# Patient Record
Sex: FEMALE | Race: BLACK OR AFRICAN AMERICAN | Employment: FULL TIME | URBAN - METROPOLITAN AREA
[De-identification: names, ages, dates, MRNs, and addresses within clinical notes are randomized per-mention and may not be internally consistent; named-entity substitution may affect disease eponyms.]

---

## 2019-01-15 ENCOUNTER — HOSPITAL ENCOUNTER (OUTPATIENT)
Age: 29
Setting detail: SPECIMEN
Discharge: HOME | End: 2019-01-15
Payer: COMMERCIAL

## 2019-01-15 ENCOUNTER — OFFICE VISIT (OUTPATIENT)
Dept: FAMILY MEDICINE CLINIC | Age: 29
End: 2019-01-15
Payer: COMMERCIAL

## 2019-01-15 VITALS
SYSTOLIC BLOOD PRESSURE: 120 MMHG | DIASTOLIC BLOOD PRESSURE: 70 MMHG | TEMPERATURE: 98 F | BODY MASS INDEX: 38.95 KG/M2 | HEART RATE: 112 BPM | WEIGHT: 257 LBS | RESPIRATION RATE: 24 BRPM | HEIGHT: 68 IN | OXYGEN SATURATION: 98 %

## 2019-01-15 DIAGNOSIS — J06.9 ACUTE UPPER RESPIRATORY INFECTION: ICD-10-CM

## 2019-01-15 DIAGNOSIS — J02.9 SORE THROAT: ICD-10-CM

## 2019-01-15 DIAGNOSIS — H66.91 RIGHT OTITIS MEDIA, UNSPECIFIED OTITIS MEDIA TYPE: Primary | ICD-10-CM

## 2019-01-15 LAB — S PYO AG THROAT QL: NORMAL

## 2019-01-15 PROCEDURE — 99213 OFFICE O/P EST LOW 20 MIN: CPT | Performed by: NURSE PRACTITIONER

## 2019-01-15 PROCEDURE — 87880 STREP A ASSAY W/OPTIC: CPT | Performed by: NURSE PRACTITIONER

## 2019-01-15 PROCEDURE — 87070 CULTURE OTHR SPECIMN AEROBIC: CPT

## 2019-01-15 RX ORDER — NORGESTIMATE AND ETHINYL ESTRADIOL 7DAYSX3 28
KIT ORAL
Refills: 3 | COMMUNITY
Start: 2019-01-09 | End: 2021-06-25 | Stop reason: ALTCHOICE

## 2019-01-15 RX ORDER — LEVOTHYROXINE SODIUM 0.07 MG/1
TABLET ORAL
Refills: 3 | COMMUNITY
Start: 2018-12-15 | End: 2021-07-16 | Stop reason: ALTCHOICE

## 2019-01-15 RX ORDER — CEFDINIR 300 MG/1
300 CAPSULE ORAL 2 TIMES DAILY
Qty: 20 CAPSULE | Refills: 0 | Status: SHIPPED | OUTPATIENT
Start: 2019-01-15 | End: 2019-01-25

## 2019-01-15 ASSESSMENT — ENCOUNTER SYMPTOMS
COUGH: 1
SINUS PAIN: 1
WHEEZING: 0
SORE THROAT: 1
RHINORRHEA: 1
SINUS PRESSURE: 1
SHORTNESS OF BREATH: 1

## 2019-01-15 ASSESSMENT — PATIENT HEALTH QUESTIONNAIRE - PHQ9
2. FEELING DOWN, DEPRESSED OR HOPELESS: 0
SUM OF ALL RESPONSES TO PHQ QUESTIONS 1-9: 0
SUM OF ALL RESPONSES TO PHQ9 QUESTIONS 1 & 2: 0
1. LITTLE INTEREST OR PLEASURE IN DOING THINGS: 0
SUM OF ALL RESPONSES TO PHQ QUESTIONS 1-9: 0

## 2019-01-18 LAB — THROAT CULTURE: NORMAL

## 2020-06-12 ENCOUNTER — HOSPITAL ENCOUNTER (EMERGENCY)
Age: 30
Discharge: HOME | End: 2020-06-12
Attending: EMERGENCY MEDICINE
Payer: COMMERCIAL

## 2020-06-12 VITALS
WEIGHT: 240 LBS | RESPIRATION RATE: 18 BRPM | BODY MASS INDEX: 36.37 KG/M2 | TEMPERATURE: 98.5 F | HEART RATE: 79 BPM | DIASTOLIC BLOOD PRESSURE: 66 MMHG | HEIGHT: 68 IN | SYSTOLIC BLOOD PRESSURE: 136 MMHG | OXYGEN SATURATION: 99 %

## 2020-06-12 LAB
ALBUMIN SERPL-MCNC: 4.5 G/DL (ref 3.5–4.6)
ALP BLD-CCNC: 54 U/L (ref 40–130)
ALT SERPL-CCNC: 21 U/L (ref 0–33)
ANION GAP SERPL CALCULATED.3IONS-SCNC: 11 MEQ/L (ref 9–15)
AST SERPL-CCNC: 19 U/L (ref 0–35)
BACTERIA: ABNORMAL /HPF
BASOPHILS ABSOLUTE: 0 K/UL (ref 0–0.2)
BASOPHILS RELATIVE PERCENT: 0.4 %
BILIRUB SERPL-MCNC: <0.2 MG/DL (ref 0.2–0.7)
BILIRUBIN URINE: NEGATIVE
BLOOD, URINE: NEGATIVE
BUN BLDV-MCNC: 10 MG/DL (ref 6–20)
CALCIUM SERPL-MCNC: 9.7 MG/DL (ref 8.5–9.9)
CHLORIDE BLD-SCNC: 105 MEQ/L (ref 95–107)
CLARITY: CLEAR
CO2: 24 MEQ/L (ref 20–31)
COLOR: YELLOW
CREAT SERPL-MCNC: 0.83 MG/DL (ref 0.5–0.9)
EOSINOPHILS ABSOLUTE: 0.1 K/UL (ref 0–0.7)
EOSINOPHILS RELATIVE PERCENT: 0.6 %
EPITHELIAL CELLS, UA: ABNORMAL /HPF
GFR AFRICAN AMERICAN: >60
GFR NON-AFRICAN AMERICAN: >60
GLOBULIN: 3.5 G/DL (ref 2.3–3.5)
GLUCOSE BLD-MCNC: 93 MG/DL (ref 70–99)
GLUCOSE URINE: NEGATIVE MG/DL
HCG(URINE) PREGNANCY TEST: NEGATIVE
HCT VFR BLD CALC: 39.2 % (ref 37–47)
HEMOGLOBIN: 12.8 G/DL (ref 12–16)
KETONES, URINE: NEGATIVE MG/DL
LEUKOCYTE ESTERASE, URINE: NORMAL
LYMPHOCYTES ABSOLUTE: 2.2 K/UL (ref 1–4.8)
LYMPHOCYTES RELATIVE PERCENT: 19.7 %
MCH RBC QN AUTO: 27.7 PG (ref 27–31.3)
MCHC RBC AUTO-ENTMCNC: 32.7 % (ref 33–37)
MCV RBC AUTO: 84.6 FL (ref 82–100)
MONOCYTES ABSOLUTE: 1.1 K/UL (ref 0.2–0.8)
MONOCYTES RELATIVE PERCENT: 10.3 %
NEUTROPHILS ABSOLUTE: 7.5 K/UL (ref 1.4–6.5)
NEUTROPHILS RELATIVE PERCENT: 69 %
NITRITE, URINE: NEGATIVE
PDW BLD-RTO: 13.6 % (ref 11.5–14.5)
PH UA: 6 (ref 5–9)
PLATELET # BLD: 341 K/UL (ref 130–400)
POTASSIUM SERPL-SCNC: 3.8 MEQ/L (ref 3.4–4.9)
PROTEIN UA: NEGATIVE MG/DL
RBC # BLD: 4.63 M/UL (ref 4.2–5.4)
RBC UA: ABNORMAL /HPF (ref 0–2)
SODIUM BLD-SCNC: 140 MEQ/L (ref 135–144)
SPECIFIC GRAVITY UA: <=1.005 (ref 1–1.03)
TOTAL PROTEIN: 8 G/DL (ref 6.3–8)
URINE REFLEX TO CULTURE: NORMAL
UROBILINOGEN, URINE: 0.2 E.U./DL
WBC # BLD: 10.9 K/UL (ref 4.8–10.8)
WBC UA: ABNORMAL /HPF (ref 0–5)

## 2020-06-12 PROCEDURE — 81001 URINALYSIS AUTO W/SCOPE: CPT

## 2020-06-12 PROCEDURE — 87491 CHLMYD TRACH DNA AMP PROBE: CPT

## 2020-06-12 PROCEDURE — 87591 N.GONORRHOEAE DNA AMP PROB: CPT

## 2020-06-12 PROCEDURE — 99284 EMERGENCY DEPT VISIT MOD MDM: CPT

## 2020-06-12 PROCEDURE — 80053 COMPREHEN METABOLIC PANEL: CPT

## 2020-06-12 PROCEDURE — 85025 COMPLETE CBC W/AUTO DIFF WBC: CPT

## 2020-06-12 PROCEDURE — 84703 CHORIONIC GONADOTROPIN ASSAY: CPT

## 2020-06-12 RX ORDER — KETOROLAC TROMETHAMINE 10 MG/1
10 TABLET, FILM COATED ORAL EVERY 6 HOURS PRN
Qty: 20 TABLET | Refills: 0 | Status: SHIPPED | OUTPATIENT
Start: 2020-06-12 | End: 2021-01-29

## 2020-06-12 ASSESSMENT — ENCOUNTER SYMPTOMS
BACK PAIN: 1
COUGH: 0
VOMITING: 0
DIARRHEA: 0
ABDOMINAL PAIN: 1

## 2020-06-12 ASSESSMENT — PAIN DESCRIPTION - PAIN TYPE: TYPE: ACUTE PAIN

## 2020-06-12 ASSESSMENT — PAIN DESCRIPTION - ORIENTATION: ORIENTATION: LOWER

## 2020-06-12 ASSESSMENT — PAIN DESCRIPTION - FREQUENCY: FREQUENCY: INTERMITTENT

## 2020-06-12 ASSESSMENT — PAIN DESCRIPTION - DESCRIPTORS: DESCRIPTORS: CRAMPING;STABBING

## 2020-06-12 ASSESSMENT — PAIN SCALES - GENERAL: PAINLEVEL_OUTOF10: 8

## 2020-06-12 ASSESSMENT — PAIN - FUNCTIONAL ASSESSMENT: PAIN_FUNCTIONAL_ASSESSMENT: PREVENTS OR INTERFERES SOME ACTIVE ACTIVITIES AND ADLS

## 2020-06-12 ASSESSMENT — PAIN DESCRIPTION - LOCATION: LOCATION: ABDOMEN

## 2020-06-12 ASSESSMENT — PAIN DESCRIPTION - ONSET: ONSET: ON-GOING

## 2020-06-12 NOTE — ED PROVIDER NOTES
1 TABLET BY MOUTH EVERY DAY       ALLERGIES     Codeine    FAMILY HISTORY     History reviewed. No pertinent family history. SOCIAL HISTORY       Social History     Socioeconomic History    Marital status: Single     Spouse name: None    Number of children: None    Years of education: None    Highest education level: None   Occupational History    None   Social Needs    Financial resource strain: None    Food insecurity     Worry: None     Inability: None    Transportation needs     Medical: None     Non-medical: None   Tobacco Use    Smoking status: Never Smoker    Smokeless tobacco: Never Used   Substance and Sexual Activity    Alcohol use: Yes     Comment: pt drank 3 white claws tonight    Drug use: Not Currently    Sexual activity: None   Lifestyle    Physical activity     Days per week: None     Minutes per session: None    Stress: None   Relationships    Social connections     Talks on phone: None     Gets together: None     Attends [employer] service: None     Active member of club or organization: None     Attends meetings of clubs or organizations: None     Relationship status: None    Intimate partner violence     Fear of current or ex partner: None     Emotionally abused: None     Physically abused: None     Forced sexual activity: None   Other Topics Concern    None   Social History Narrative    None       SCREENINGS      @FLOW([id number])@      PHYSICAL EXAM    (up to 7 for level 4, 8 or more for level 5)     ED Triage Vitals [06/12/20 0059]   BP Temp Temp Source Pulse Resp SpO2 Height Weight   136/66 98.5 °F (36.9 °C) Infrared 79 18 99 % 5' 8\" (1.727 m) 240 lb (108.9 kg)       Physical Exam  This is a well-developed well-nourished patient without distress. Conjunctivae are clear. No visible sinus discharge. No significant facial swelling. Neck supple without visible JVD. Lungs are clear and symmetric without distress. Regular rate and rhythm without murmurs. Abdomen soft. TempSrc: Infrared   SpO2: 99%   Weight: 240 lb (108.9 kg)   Height: 5' 8\" (1.727 m)       Patient has history of ovarian cyst and could have recurrence. Outpatient pelvic ultrasound prescription given. Symptomatic treatment with oral Toradol. Discussed with patient expressed understanding and agreement. MDM    CRITICAL CARE TIME   Total Critical Care time was  minutes, excluding separately reportableprocedures. There was a high probability of clinicallysignificant/life threatening deterioration in the patient's condition which required my urgent intervention. CONSULTS:  None    PROCEDURES:  Unless otherwise noted below, none     Procedures    FINAL IMPRESSION      1.  Pelvic pain in female        DISPOSITION/PLAN   DISPOSITION Decision To Discharge 06/12/2020 01:48:01 AM      PATIENT REFERRED TO:  ROBYN Ham  [facility].  [phone]      As needed if not improving within 72 hours      DISCHARGE MEDICATIONS:  New Prescriptions    KETOROLAC (TORADOL) 10 MG TABLET    Take 1 tablet by mouth every 6 hours as needed for Pain          (Please note that portions of this note were completed with a voice recognitionprogram.  Efforts were made to edit the dictations but occasionally words are mis-transcribed.)    Tracey Marin MD (electronically signed)  Attending Emergency Physician        Lawson Dallas MD  06/12/20 0150

## 2020-06-12 NOTE — ED TRIAGE NOTES
Pt to ED with c/o lower abdominal/groin pain for 3 days. Pt states she has had this pain every couple months. Pt states she took advil tonight around midnight. Pt also states she drank 3 white claws tonight.  Denies nausea, Last BM today

## 2020-06-17 LAB
C. TRACHOMATIS DNA ,URINE: NEGATIVE
N. GONORRHOEAE DNA, URINE: NEGATIVE

## 2020-12-31 ENCOUNTER — HOSPITAL ENCOUNTER (EMERGENCY)
Age: 30
Discharge: HOME | End: 2020-12-31
Attending: EMERGENCY MEDICINE
Payer: COMMERCIAL

## 2020-12-31 ENCOUNTER — APPOINTMENT (OUTPATIENT)
Dept: GENERAL RADIOLOGY | Age: 30
End: 2020-12-31
Payer: COMMERCIAL

## 2020-12-31 VITALS
DIASTOLIC BLOOD PRESSURE: 69 MMHG | RESPIRATION RATE: 17 BRPM | OXYGEN SATURATION: 100 % | TEMPERATURE: 98.1 F | WEIGHT: 243 LBS | HEIGHT: 68 IN | SYSTOLIC BLOOD PRESSURE: 115 MMHG | HEART RATE: 77 BPM | BODY MASS INDEX: 36.83 KG/M2

## 2020-12-31 DIAGNOSIS — S93.401A SPRAIN OF RIGHT ANKLE, UNSPECIFIED LIGAMENT, INITIAL ENCOUNTER: Primary | ICD-10-CM

## 2020-12-31 PROCEDURE — 6370000000 HC RX 637 (ALT 250 FOR IP): Performed by: EMERGENCY MEDICINE

## 2020-12-31 PROCEDURE — 73610 X-RAY EXAM OF ANKLE: CPT

## 2020-12-31 PROCEDURE — 99283 EMERGENCY DEPT VISIT LOW MDM: CPT

## 2020-12-31 PROCEDURE — 99284 EMERGENCY DEPT VISIT MOD MDM: CPT

## 2020-12-31 RX ORDER — ETONOGESTREL AND ETHINYL ESTRADIOL 11.7; 2.7 MG/1; MG/1
1 INSERT, EXTENDED RELEASE VAGINAL SEE ADMIN INSTRUCTIONS
COMMUNITY
End: 2021-10-26

## 2020-12-31 RX ORDER — IBUPROFEN 400 MG/1
800 TABLET ORAL ONCE
Status: COMPLETED | OUTPATIENT
Start: 2020-12-31 | End: 2020-12-31

## 2020-12-31 RX ADMIN — IBUPROFEN 800 MG: 400 TABLET, FILM COATED ORAL at 21:35

## 2020-12-31 ASSESSMENT — PAIN SCALES - GENERAL: PAINLEVEL_OUTOF10: 6

## 2020-12-31 ASSESSMENT — PAIN DESCRIPTION - ORIENTATION
ORIENTATION: RIGHT
ORIENTATION: RIGHT

## 2020-12-31 ASSESSMENT — PAIN DESCRIPTION - DESCRIPTORS: DESCRIPTORS: SHARP

## 2020-12-31 ASSESSMENT — PAIN DESCRIPTION - LOCATION: LOCATION: ANKLE

## 2021-01-01 NOTE — ED PROVIDER NOTES
[facility] ED  EMERGENCY DEPARTMENT ENCOUNTER      Pt Name: Carito Canseco  MRN: [id number]  [place] 1990  Date of evaluation: 12/31/2020  Provider: Sachi Donald DO        HISTORY OF PRESENT ILLNESS    Carito Canseco is a 35 y.o. female per chart review has ah/o PCO's and thyroid disease. She was walking on steps and slipped. The history is provided by the patient. Ankle Problem  Location:  Ankle  Time since incident:  30 minutes  Injury: yes    Mechanism of injury: fall    Fall:     Fall occurred:  Standing    Impact surface:  Leslie    Entrapped after fall: no    Ankle location:  R ankle  Pain details:     Quality:  Aching    Radiates to:  Does not radiate    Severity:  Moderate    Onset quality:  Sudden    Timing:  Constant  Chronicity:  New  Dislocation: no    Foreign body present:  No foreign bodies  Prior injury to area:  No  Relieved by:  Nothing  Worsened by:  Nothing  Ineffective treatments:  None tried  Associated symptoms: no back pain, no fever and no neck pain             REVIEW OF SYSTEMS       Review of Systems   Constitutional: Negative for chills and fever. HENT: Negative for ear pain and sore throat. Eyes: Negative for discharge and redness. Respiratory: Negative for cough and shortness of breath. Cardiovascular: Negative for chest pain and palpitations. Gastrointestinal: Negative for abdominal pain, nausea and vomiting. Genitourinary: Negative for difficulty urinating and dysuria. Musculoskeletal: Positive for arthralgias. Negative for back pain and neck pain. Skin: Negative for rash and wound. Neurological: Negative for dizziness and syncope. Psychiatric/Behavioral: Negative for confusion. The patient is not nervous/anxious. All other systems reviewed and are negative. Except as noted above the remainder of the review of systems was reviewed and negative.        PAST MEDICAL HISTORY     Past Medical History:   Diagnosis Date    PCOS (polycystic ovarian syndrome)     Thyroid disease          SURGICAL HISTORY       Past Surgical History:   Procedure Laterality Date    LAPAROSCOPY           CURRENT MEDICATIONS       Previous Medications    ETONOGESTREL-ETHINYL ESTRADIOL (NUVARING) 0.12-0.015 MG/24HR VAGINAL RING    Place 1 each vaginally See Admin Instructions    KETOROLAC (TORADOL) 10 MG TABLET    Take 1 tablet by mouth every 6 hours as needed for Pain    LEVOTHYROXINE (SYNTHROID) 75 MCG TABLET    TAKE 1 TABLET BY MOUTH EVERY DAY BEFORE BREAKFAST    TRI FEMYNOR 0.18/0.215/0.25 MG-35 MCG TABS    TAKE 1 TABLET BY MOUTH EVERY DAY       ALLERGIES     Codeine    FAMILY HISTORY     History reviewed. No pertinent family history. SOCIAL HISTORY       Social History     Socioeconomic History    Marital status: Single     Spouse name: None    Number of children: None    Years of education: None    Highest education level: None   Occupational History    None   Social Needs    Financial resource strain: None    Food insecurity     Worry: None     Inability: None    Transportation needs     Medical: None     Non-medical: None   Tobacco Use    Smoking status: Never Smoker    Smokeless tobacco: Never Used   Substance and Sexual Activity    Alcohol use:  Yes    Drug use: Never    Sexual activity: None   Lifestyle    Physical activity     Days per week: None     Minutes per session: None    Stress: None   Relationships    Social connections     Talks on phone: None     Gets together: None     Attends Anabaptist service: None     Active member of club or organization: None     Attends meetings of clubs or organizations: None     Relationship status: None    Intimate partner violence     Fear of current or ex partner: None     Emotionally abused: None     Physically abused: None     Forced sexual activity: None   Other Topics Concern    None   Social History Narrative    None         PHYSICAL EXAM       ED Triage Vitals [12/31/20 2107]   BP Temp Temp MDM  Number of Diagnoses or Management Options  Sprain of right ankle, unspecified ligament, initial encounter  Diagnosis management comments: Patient presents with right ankle pain after slipping on steps tonight. Xray of the right ankle ordered. Xray of the right ankle is negative. Crutches and air cast ordered. The patient will elevate, ice and NSAIDs. She will stay off the ankle. Oleg CABAN     The lab results, radiology and test results were reviewed with the patient and family. The patient will follow up in 2 days with their primary care doctor. If their symptoms change or get worse they will return to the ER. CRITICAL CARE TIME   Total CriticalCare time was 0 minutes, excluding separately reportable procedures. There was a high probability of clinically significant/life threatening deterioration in the patient's condition which required my urgent intervention. PROCEDURES:  Unlessotherwise noted below, none     Procedures      FINAL IMPRESSION      1.  Sprain of right ankle, unspecified ligament, initial encounter          [facility], DO (electronically signed)  Attending Emergency Physician          Jacob Cade   12/31/20 [id number]

## 2021-01-01 NOTE — ED NOTES
Air cast applied. Crutch training given and pt returned demonstration.       Kimi Lares, RN  12/31/20 7386

## 2021-01-29 ENCOUNTER — HOSPITAL ENCOUNTER (OUTPATIENT)
Age: 31
Setting detail: SPECIMEN
Discharge: HOME | End: 2021-01-29
Payer: COMMERCIAL

## 2021-01-29 ENCOUNTER — VIRTUAL VISIT (OUTPATIENT)
Dept: FAMILY MEDICINE CLINIC | Age: 31
End: 2021-01-29
Payer: COMMERCIAL

## 2021-01-29 DIAGNOSIS — R05.8 PRODUCTIVE COUGH: Primary | ICD-10-CM

## 2021-01-29 DIAGNOSIS — R50.9 LOW GRADE FEVER: ICD-10-CM

## 2021-01-29 PROCEDURE — 99442 PR PHYS/QHP TELEPHONE EVALUATION 11-20 MIN: CPT | Performed by: NURSE PRACTITIONER

## 2021-01-29 SDOH — ECONOMIC STABILITY: FOOD INSECURITY: WITHIN THE PAST 12 MONTHS, YOU WORRIED THAT YOUR FOOD WOULD RUN OUT BEFORE YOU GOT MONEY TO BUY MORE.: NEVER TRUE

## 2021-01-29 SDOH — ECONOMIC STABILITY: INCOME INSECURITY: HOW HARD IS IT FOR YOU TO PAY FOR THE VERY BASICS LIKE FOOD, HOUSING, MEDICAL CARE, AND HEATING?: NOT HARD AT ALL

## 2021-01-29 ASSESSMENT — ENCOUNTER SYMPTOMS
NAUSEA: 1
RHINORRHEA: 1
COUGH: 1
SORE THROAT: 0
SHORTNESS OF BREATH: 0
ABDOMINAL PAIN: 0
WHEEZING: 0
DIARRHEA: 1
CHEST TIGHTNESS: 0

## 2021-01-29 ASSESSMENT — PATIENT HEALTH QUESTIONNAIRE - PHQ9
SUM OF ALL RESPONSES TO PHQ QUESTIONS 1-9: 0
1. LITTLE INTEREST OR PLEASURE IN DOING THINGS: 0

## 2021-01-29 NOTE — PATIENT INSTRUCTIONS
We'll call with COVID-19 results  Results will also be available on your [facility] account    Jony Maddox RN, MSN, [facility]  [facility] [address]  [address]: [phone]    Things to Know:     - Do not leave home except to get medical care while waiting for your results  - Testing does not change treatment- there is no medication that treats COVID-19  - Stay well-hydrated, rest as needed  - May take over the counter medicine acetaminophen (aka Tylenol) for pain or fever if needed according to the directions on the box if tolerated no allergies  - Monitor your symptoms + contact a medical provider if symptoms are worsening- such as difficulty breathing  - Follow social distancing guidelines and wear a face mask when leaving home is necessary  - Symptoms may develop 2 days to 2 weeks following exposure to the virus- if you are exposed after testing, or if you were in the incubation period when tested, you could become ill with COVID-19 later    Seek emergency medical care immediately (ER/ call 911) if you have trouble breathing, persistent pain or pressure in the chest, new confusion, inability to wake or stay awake, bluish lips or face, or any other signs/ symptoms that you think could be an emergency. Patient Education   10 Things to Do When You Have COVID-19    Stay home. Don't go to school, work, or public areas. And don't use public transportation, ride-shares, or taxis unless you have no choice. Leave your home only if you need to get medical care. But call the doctor's office first so they know you're coming. And wear a cloth face cover. Ask before leaving isolation. Talk with your doctor or other health professional about when it will be safe for you to leave isolation. Wear a cloth face cover when you are around other people. It can help stop the spread of the virus when you cough or sneeze. Limit contact with people in your home. If possible, stay in a separate bedroom and use a separate bathroom. Avoid contact with pets and other animals. If possible, have a friend or family member care for them while you're sick. Cover your mouth and nose with a tissue when you cough or sneeze. Then throw the tissue in the trash right away. Wash your hands often, especially after you cough or sneeze. Use soap and water, and scrub for at least 20 seconds. If soap and water aren't available, use an alcohol-based hand . Don't share personal household items. These include bedding, towels, cups and glasses, and eating utensils. Clean and disinfect your home every day. Use household  or disinfectant wipes or sprays. Take special care to clean things that you grab with your hands. These include doorknobs, remote controls, phones, and handles on your refrigerator and microwave. And don't forget countertops, tabletops, bathrooms, and computer keyboards. Take acetaminophen (Tylenol) to relieve fever and body aches. Read and follow all instructions on the label. Current as of: December 18, 2020               Content Version: 12.7  © 2006-2021 Healthwise, [facility]. Care instructions adapted under license by [facility] ([facility]). If you have questions about a medical condition or this instruction, always ask your healthcare professional. [place] [address] any warranty or liability for your use of this information.

## 2021-01-29 NOTE — PROGRESS NOTES
TELEHEALTH VISIT -- Audio Only     SUBJECTIVE:    Ly Chambers is a 35 y.o. female evaluated via telephone on 1/29/2021. Consent:  Malcolm Us and/or health care decision maker is aware that that she may receive a bill for this telephone service, depending on her insurance coverage, and has provided verbal consent to proceed: Yes    Documentation:  I communicated with the patient and/or health care decision maker about:    Chief Complaint   Patient presents with    Cough     started tuesday night dry cough, productive cough; wednesday headache, bodyache, and runny nose started  earache started today. low grade fever since wednesday 98.9-99.8      History of Present Illness:  Malcolm Us is here for telephone visit with URI symptoms, requests COVID-19 testing. Presenting symptoms: low-grade fever, cough, nasal congestion vs rhinorrhea, sputum production, R otalgia, diarrhea, generalized HA 3-5/10, muscle pain, fatigue/ malaise. Symptoms began: 1/26        Max temperature in last 24 hrs: 99.8- forehead thermometer  Patient denies: fever > 100.4, sore throat, shortness of breath, chest pain, abdominal pain, loss of smell, and loss of taste    Treatment to date: none. Exposure source: attended a birthday gathering at a [employer] in [place] -- [facility]. Relevant PMH: no pertinent PMH. Smoking history: patient  reports that she has never smoked. She has never used smokeless tobacco.   No recent travel. Review of Systems   Constitutional: Positive for chills, fatigue and fever (low grade). HENT: Positive for congestion, ear pain and rhinorrhea. Negative for sore throat. Respiratory: Positive for cough. Negative for chest tightness, shortness of breath and wheezing. Cardiovascular: Negative for chest pain and palpitations. Gastrointestinal: Positive for diarrhea and nausea. Negative for abdominal pain.      OBJECTIVE:     Vital Signs: (As obtained by: pt or caregiver) 15 minutes were spent on the phone with patient. Provider performed history of present illness and review of systems. Diagnosis and treatment plan was discussed with patient. Pharmacy of choice was reviewed along with past medical history, medication allergies, and current medications. Education provided to patient or patient parents/guardian with current illness diagnosis as well as when to seek additional healthcare due to changing or for worsening symptoms. Patient voiced understanding.

## 2021-01-29 NOTE — LETTER
[address]  Phone: [phone]  Fax: GIGI Wade CNP    Patient: Duncan Najera   Date of Visit: 1/29/2021       To Whom it May Concern:    Duncan Najera was evaluated during a virtual visit and tested today in the clinic. It is my medical opinion that Ms. Morgan Garnica should not return to work until . [employer] are back. We expect results in 2-5 days. If there are questions or concerns, please have the patient contact our office. Thank you for your assistance in this matter.           Respectfully,        Electronically signed by GIGI Landers CNP on 1/29/2021 at 3:47 PM

## 2021-01-30 DIAGNOSIS — R50.9 LOW GRADE FEVER: ICD-10-CM

## 2021-01-30 DIAGNOSIS — R05.8 PRODUCTIVE COUGH: ICD-10-CM

## 2021-02-02 LAB
SARS-COV-2: DETECTED
SOURCE: ABNORMAL

## 2021-02-03 ENCOUNTER — TELEPHONE (OUTPATIENT)
Dept: FAMILY MEDICINE CLINIC | Age: 31
End: 2021-02-03

## 2021-02-03 DIAGNOSIS — U07.1 COVID-19 VIRUS INFECTION: Primary | ICD-10-CM

## 2021-02-04 NOTE — TELEPHONE ENCOUNTER
Chief Complaint   Patient presents with    Results     notification of positive COVID-19 test result      Called and spoke with Sheldon Davalos. Patient was informed her COVID-19 test result was positive. The following information was given to the patient:   - The COVID-19 test result was positive- meaning she has the virus and is contagious  - Treatment of coronavirus does not require an antibiotic  - Stay well-hydrated, rest as needed  - May take OTC medicine for pain or fever, follow directions on label  - Remain isolated except to receive medical care for 10 days since symptoms first appeared and  o No fever for at least 24 hours without using medicine that lowers fever and  o Other symptoms have improved  - Wash hands often with soap and water for at least 20 seconds or alternatively use hand  with at least 60% alcohol content  - Clean all high-touch surfaces daily such as doorknobs and cell phones    Patient reports overall symptoms are stable. Reports loss of smell and occasional chest tightness, shortness of breath with activity. Feels a sensation of having water in nose. Denies dyspnea at rest or pleuritic pain. Anticipated return to work date 2/6. Counseled on warning symptoms that should prompt re-evaluation by telehealth visit or go to ED, such as difficulty breathing. Lab Results   Component Value Date/Time    COVID19 Detected (A) 01/30/2021 07:02 AM       ICD-10-CM    1. COVID-19 virus infection  U07.1      Questions answered. Understanding verbalized. Encouraged patient to call with any concerns/ additional questions.     Electronically signed by GIGI Buckner CNP on 2/3/2021 at 8:46 PM

## 2021-02-05 ENCOUNTER — PATIENT MESSAGE (OUTPATIENT)
Dept: FAMILY MEDICINE CLINIC | Age: 31
End: 2021-02-05

## 2021-02-05 NOTE — LETTER
NOTIFICATION RETURN TO WORK / SCHOOL    2/5/2021    Ms. Duncan Najera  [facility] [address]      To Whom It May Concern:    Duncan Najera was tested for COVID-19 on 1/30, and the result was Positive. Symptoms started 1/26/21. She has maintained Quarantine x 10 days (from first signs of symptoms). She may return to work on 2/8, if symptoms have improved and she has been fever free for 24 hours, without medication. I recommend:return without restrictions    If there are questions or concerns, please have the patient contact our office.     Sincerely,  Arpita Benton, GIGI - CNP

## 2021-06-25 ENCOUNTER — OFFICE VISIT (OUTPATIENT)
Dept: FAMILY MEDICINE CLINIC | Age: 31
End: 2021-06-25
Payer: COMMERCIAL

## 2021-06-25 VITALS
BODY MASS INDEX: 38.65 KG/M2 | SYSTOLIC BLOOD PRESSURE: 110 MMHG | HEIGHT: 68 IN | RESPIRATION RATE: 18 BRPM | OXYGEN SATURATION: 98 % | WEIGHT: 255 LBS | TEMPERATURE: 97.8 F | DIASTOLIC BLOOD PRESSURE: 68 MMHG | HEART RATE: 93 BPM

## 2021-06-25 DIAGNOSIS — R42 VERTIGO: Primary | ICD-10-CM

## 2021-06-25 DIAGNOSIS — Z00.00 ROUTINE GENERAL MEDICAL EXAMINATION AT A HEALTH CARE FACILITY: ICD-10-CM

## 2021-06-25 DIAGNOSIS — H66.92 LEFT OTITIS MEDIA, UNSPECIFIED OTITIS MEDIA TYPE: ICD-10-CM

## 2021-06-25 DIAGNOSIS — E28.2 PCOS (POLYCYSTIC OVARIAN SYNDROME): ICD-10-CM

## 2021-06-25 DIAGNOSIS — Z11.4 SCREENING FOR HIV (HUMAN IMMUNODEFICIENCY VIRUS): ICD-10-CM

## 2021-06-25 DIAGNOSIS — H93.8X3 PRESSURE SENSATION IN BOTH EARS: ICD-10-CM

## 2021-06-25 DIAGNOSIS — E07.9 THYROID DISEASE: ICD-10-CM

## 2021-06-25 DIAGNOSIS — R79.89 ELEVATED PROLACTIN LEVEL: ICD-10-CM

## 2021-06-25 DIAGNOSIS — Z11.59 NEED FOR HEPATITIS C SCREENING TEST: ICD-10-CM

## 2021-06-25 DIAGNOSIS — R61 NIGHT SWEATS: ICD-10-CM

## 2021-06-25 DIAGNOSIS — R00.2 PALPITATIONS: ICD-10-CM

## 2021-06-25 PROCEDURE — 99214 OFFICE O/P EST MOD 30 MIN: CPT | Performed by: NURSE PRACTITIONER

## 2021-06-25 RX ORDER — LEVOTHYROXINE SODIUM 0.07 MG/1
TABLET ORAL
Qty: 30 TABLET | Refills: 3 | Status: CANCELLED | OUTPATIENT
Start: 2021-06-25

## 2021-06-25 RX ORDER — AMOXICILLIN AND CLAVULANATE POTASSIUM 875; 125 MG/1; MG/1
1 TABLET, FILM COATED ORAL 2 TIMES DAILY
Qty: 20 TABLET | Refills: 0 | Status: SHIPPED | OUTPATIENT
Start: 2021-06-25 | End: 2021-07-05

## 2021-06-25 ASSESSMENT — PATIENT HEALTH QUESTIONNAIRE - PHQ9
SUM OF ALL RESPONSES TO PHQ9 QUESTIONS 1 & 2: 0
SUM OF ALL RESPONSES TO PHQ QUESTIONS 1-9: 0
2. FEELING DOWN, DEPRESSED OR HOPELESS: 0
SUM OF ALL RESPONSES TO PHQ QUESTIONS 1-9: 0
SUM OF ALL RESPONSES TO PHQ QUESTIONS 1-9: 0
1. LITTLE INTEREST OR PLEASURE IN DOING THINGS: 0

## 2021-06-25 NOTE — PROGRESS NOTES
Theodore Siemens is making the first visit to the office to establish. The primary issue is dizzy spells. Dizzy spells: happen more with position changes and sometimes can happen when she is driving. She gets nauseated with a bad headache afterwards. Middle of the forehead. She has had some occasional heart palpitations. States that she has had the symptoms off and on for about the past year. No other cardiac symptoms reported but states that she can have some discomfort in her chest when she feels the palpitations. She states that it feels as though her heart is beating irregularly. She has not felt that she would pass out but she tends to brace herself when she feels the symptoms coming on and generally will hold onto something. She does not report any longstanding headache history. He does report occasional issues with allergies or sinuses. Used to have a lot of recurrent ear infections as a child. Has been having some pressure in both of her ears lately. Night sweats: started around August of last year. Some nights during the week. Wakes up drenched in sweat. She then gets very cold. She has been on the NuvaRing birth control for quite some time and needs to follow with holistic gy[facility] in [place]. Menses have been light and regular. No pelvic pain reported. PCOS/hypothyroidism: she has not been on thyroid medication for about 5 years now. Was diagnosed with PCOS around 2008 or 2009. Was started on thyroid medication around that time. She does not report any difficulty swallowing or change in voice. She states that she did have to have a procedure in the past for removal of ovarian cyst.   She brought some blood test results from [facility] [place] and there was noted to be elevated prolactin level in the past.    ROS: This patient reports no chest pain or pressure. There is no shortness of breath or cough. The patient reports no nausea or vomiting.   There is no heartburn or indigestion. There is no diarrhea but has had occasional constipation. No black, bloody, mucusy or tarry stool noticed. The patient reports no bloating and no change in appetite. There is no numbness, tingling or swelling in the extremities. Patient Active Problem List   Diagnosis    PCOS (polycystic ovarian syndrome)    Thyroid disease       Prior to Admission medications    Medication Sig Start Date End Date Taking? Authorizing Provider   amoxicillin-clavulanate (AUGMENTIN) 875-125 MG per tablet Take 1 tablet by mouth 2 times daily for 10 days 6/25/21 7/5/21 Yes GIGI Hart - YONNY   etonogestrel-ethinyl estradiol (Rigoberto Soho) 0.12-0.015 MG/24HR vaginal ring Place 1 each vaginally See Admin Instructions   Yes Historical Provider, MD   levothyroxine (SYNTHROID) 75 MCG tablet TAKE 1 TABLET BY MOUTH EVERY DAY BEFORE BREAKFAST 12/15/18  Yes Historical Provider, MD       Past Surgical History:   Procedure Laterality Date    LAPAROSCOPY         Allergies   Allergen Reactions    Codeine        Social History     Socioeconomic History    Marital status: Single     Spouse name: Not on file    Number of children: Not on file    Years of education: Not on file    Highest education level: Not on file   Occupational History    Not on file   Tobacco Use    Smoking status: Never Smoker    Smokeless tobacco: Never Used   Substance and Sexual Activity    Alcohol use: Yes    Drug use: Never    Sexual activity: Not on file   Other Topics Concern    Not on file   Social History Narrative    Not on file     Social Determinants of Health     Financial Resource Strain: Low Risk     Difficulty of Paying Living Expenses: Not hard at all   Food Insecurity: No Food Insecurity    Worried About Running Out of Food in the Last Year: Never true    [address] in the Last Year: Never true   Transportation Needs: No Transportation Needs    Lack of Transportation (Medical):  No    Lack of Transportation (Non-Medical): No   Physical Activity:     Days of Exercise per Week:     Minutes of Exercise per Session:    Stress:     Feeling of Stress :    Social Connections:     Frequency of Communication with Friends and Family:     Frequency of Social Gatherings with Friends and Family:     Attends Yarsanism Services:     Active Member of Clubs or Organizations:     Attends Club or Organization Meetings:     Marital Status:    Intimate Partner Violence:     Fear of Current or Ex-Partner:     Emotionally Abused:     Physically Abused:     Sexually Abused:        Family History   Problem Relation Age of Onset    Cancer Mother         multiple myeloma    No Known Problems Father        EXAM:  Constitutional Blood pressure 110/68, pulse 93, temperature 97.8 °F (36.6 °C), temperature source Temporal, resp. rate 18, height 5' 8\" (1.727 m), weight 255 lb (115.7 kg), SpO2 98 %, not currently breastfeeding. .  She has a normal affect, no acute distress, appears well developed and well nourished. TM: Right with thin white fluid effusion and left with thin white fluid effusion and mild injection. Neck:  neck- supple, no mass, non-tender and no bruits  Lungs:  Normal expansion. Clear to auscultation. No rales, rhonchi, or wheezing., No chest wall tenderness. Heart:  Heart sounds are normal.  Regular rate and rhythm without murmur, gallop or rub. Abdomen:  Soft, non-tender, normal bowel sounds. No bruits, organomegaly or masses. Extremities: Extremities warm to touch, wnl, with no edema. DIAGNOSIS:    Diagnosis Orders   1. Vertigo  Vitamin B12 & Folate    Magnesium   2. Night sweats  Lipid Panel   3. PCOS (polycystic ovarian syndrome)  Hemoglobin A1C    Insulin, Total    Tamie Carroll PA-C, Endocrinology, [facility]    Dhea    Testosterone Free And Total Male    ACTH    Cortisol Am, Total    Prolactin   4.  Thyroid disease  CBC Auto Differential    Comprehensive Metabolic Panel    TSH without Reflex    T4, Free    Thyroid Peroxidase Antibody    US HEAD NECK SOFT TISSUE THYROID   5. Palpitations     6. Pressure sensation in both ears     7. Left otitis media, unspecified otitis media type  amoxicillin-clavulanate (AUGMENTIN) 875-125 MG per tablet   8. Elevated prolactin level     9. Need for hepatitis C screening test  Hepatitis C Antibody   10. Screening for HIV (human immunodeficiency virus)  HIV-1,-2 w/Reflex to HIV-1 Western Blot   11. Routine general medical examination at a health care facility         PLAN: Include orders in the DX section. Follow up: 6 weeks and as needed. Blood work to be done in the near future. Discussed recommendation for various lab work to include hormone levels as well as sugar levels. She has been diagnosed with thyroid disease in the past but has not been on medication for about 5 years. Recommend baseline thyroid ultrasound. Also check antiperoxidase antibodies. Discussed recommendation for referral to endocrinology. She is okay with this and referral given. Discussed evidence of ear infection the left side. Antibiotic ordered. This may be contributing to some of her dizziness but discussed that hormone levels and other factors can contribute to some of her symptoms including night sweats and palpitations. Physical examination is normal today. Will recommend cardiac work-up if endocrinological findings are normal.  May get work-up regardless depending if symptoms return. She is encouraged to go the emergency room if symptoms return and are significant. Please note this report has been partially produced using speech recognition software and may cause contain errors related to that system including grammar, punctuation and spelling as well as words and phrases that may seem inappropriate. If there are questions or concerns please feel free to contact me to clarify.       Electronically signed by GIGI Henderson, 2:00 PM 6/25/21

## 2021-06-26 ENCOUNTER — HOSPITAL ENCOUNTER (OUTPATIENT)
Dept: LAB | Age: 31
Discharge: HOME | End: 2021-06-26
Payer: COMMERCIAL

## 2021-06-26 DIAGNOSIS — Z11.4 SCREENING FOR HIV (HUMAN IMMUNODEFICIENCY VIRUS): ICD-10-CM

## 2021-06-26 DIAGNOSIS — E07.9 THYROID DISEASE: ICD-10-CM

## 2021-06-26 DIAGNOSIS — R42 VERTIGO: ICD-10-CM

## 2021-06-26 DIAGNOSIS — E28.2 PCOS (POLYCYSTIC OVARIAN SYNDROME): ICD-10-CM

## 2021-06-26 DIAGNOSIS — Z11.59 NEED FOR HEPATITIS C SCREENING TEST: ICD-10-CM

## 2021-06-26 DIAGNOSIS — R61 NIGHT SWEATS: ICD-10-CM

## 2021-06-26 LAB
ALBUMIN SERPL-MCNC: 4.1 G/DL (ref 3.5–4.6)
ALP BLD-CCNC: 41 U/L (ref 40–130)
ALT SERPL-CCNC: 11 U/L (ref 0–33)
ANION GAP SERPL CALCULATED.3IONS-SCNC: 9 MEQ/L (ref 9–15)
AST SERPL-CCNC: 15 U/L (ref 0–35)
BASOPHILS ABSOLUTE: 0 K/UL (ref 0–0.2)
BASOPHILS RELATIVE PERCENT: 0.7 %
BILIRUB SERPL-MCNC: <0.2 MG/DL (ref 0.2–0.7)
BUN BLDV-MCNC: 11 MG/DL (ref 6–20)
CALCIUM SERPL-MCNC: 10.1 MG/DL (ref 8.5–9.9)
CHLORIDE BLD-SCNC: 102 MEQ/L (ref 95–107)
CHOLESTEROL, TOTAL: 183 MG/DL (ref 0–199)
CO2: 25 MEQ/L (ref 20–31)
CORTISOL - AM: 21.7 UG/DL (ref 6.2–19.4)
CREAT SERPL-MCNC: 0.81 MG/DL (ref 0.5–0.9)
EOSINOPHILS ABSOLUTE: 0 K/UL (ref 0–0.7)
EOSINOPHILS RELATIVE PERCENT: 0.5 %
FOLATE: >20 NG/ML (ref 7.3–26.1)
GFR AFRICAN AMERICAN: >60
GFR NON-AFRICAN AMERICAN: >60
GLOBULIN: 3.2 G/DL (ref 2.3–3.5)
GLUCOSE BLD-MCNC: 99 MG/DL (ref 70–99)
HBA1C MFR BLD: 5.4 % (ref 4.8–5.9)
HCT VFR BLD CALC: 39.6 % (ref 37–47)
HDLC SERPL-MCNC: 66 MG/DL (ref 40–59)
HEMOGLOBIN: 13.1 G/DL (ref 12–16)
HEPATITIS C ANTIBODY INTERPRETATION: NORMAL
INSULIN: 11.2 UIU/ML (ref 2.6–24.9)
LDL CHOLESTEROL CALCULATED: 98 MG/DL (ref 0–129)
LYMPHOCYTES ABSOLUTE: 2 K/UL (ref 1–4.8)
LYMPHOCYTES RELATIVE PERCENT: 35.4 %
MAGNESIUM: 1.8 MG/DL (ref 1.7–2.4)
MCH RBC QN AUTO: 27.6 PG (ref 27–31.3)
MCHC RBC AUTO-ENTMCNC: 33.2 % (ref 33–37)
MCV RBC AUTO: 83.4 FL (ref 82–100)
MONOCYTES ABSOLUTE: 0.6 K/UL (ref 0.2–0.8)
MONOCYTES RELATIVE PERCENT: 10.2 %
NEUTROPHILS ABSOLUTE: 3 K/UL (ref 1.4–6.5)
NEUTROPHILS RELATIVE PERCENT: 53.2 %
PDW BLD-RTO: 13.3 % (ref 11.5–14.5)
PLATELET # BLD: 358 K/UL (ref 130–400)
POTASSIUM SERPL-SCNC: 4.2 MEQ/L (ref 3.4–4.9)
PROLACTIN: 20 NG/ML
RBC # BLD: 4.75 M/UL (ref 4.2–5.4)
SODIUM BLD-SCNC: 136 MEQ/L (ref 135–144)
T4 FREE: 1.36 NG/DL (ref 0.84–1.68)
TOTAL PROTEIN: 7.3 G/DL (ref 6.3–8)
TRIGL SERPL-MCNC: 95 MG/DL (ref 0–150)
TSH SERPL DL<=0.05 MIU/L-ACNC: 1.08 UIU/ML (ref 0.44–3.86)
VITAMIN B-12: 439 PG/ML (ref 232–1245)
WBC # BLD: 5.7 K/UL (ref 4.8–10.8)

## 2021-06-26 PROCEDURE — 86803 HEPATITIS C AB TEST: CPT

## 2021-06-26 PROCEDURE — 84443 ASSAY THYROID STIM HORMONE: CPT

## 2021-06-26 PROCEDURE — 82626 DEHYDROEPIANDROSTERONE: CPT

## 2021-06-26 PROCEDURE — 83036 HEMOGLOBIN GLYCOSYLATED A1C: CPT

## 2021-06-26 PROCEDURE — 80061 LIPID PANEL: CPT

## 2021-06-26 PROCEDURE — 80053 COMPREHEN METABOLIC PANEL: CPT

## 2021-06-26 PROCEDURE — 82746 ASSAY OF FOLIC ACID SERUM: CPT

## 2021-06-26 PROCEDURE — 83735 ASSAY OF MAGNESIUM: CPT

## 2021-06-26 PROCEDURE — 82533 TOTAL CORTISOL: CPT

## 2021-06-26 PROCEDURE — 84439 ASSAY OF FREE THYROXINE: CPT

## 2021-06-26 PROCEDURE — 86376 MICROSOMAL ANTIBODY EACH: CPT

## 2021-06-26 PROCEDURE — 82607 VITAMIN B-12: CPT

## 2021-06-26 PROCEDURE — 84270 ASSAY OF SEX HORMONE GLOBUL: CPT

## 2021-06-26 PROCEDURE — 84146 ASSAY OF PROLACTIN: CPT

## 2021-06-26 PROCEDURE — 85025 COMPLETE CBC W/AUTO DIFF WBC: CPT

## 2021-06-26 PROCEDURE — 83525 ASSAY OF INSULIN: CPT

## 2021-06-26 PROCEDURE — 36415 COLL VENOUS BLD VENIPUNCTURE: CPT

## 2021-06-26 PROCEDURE — 84403 ASSAY OF TOTAL TESTOSTERONE: CPT

## 2021-06-28 NOTE — RESULT ENCOUNTER NOTE
Please notify Haritha Chang that lab results are normal overall other than a slightly high cortisol level. Keep appt with endocrinology and Follow up as scheduled with me as well.

## 2021-06-29 LAB
REASON FOR REJECTION: NORMAL
REJECTED TEST: NORMAL

## 2021-06-30 LAB
DHEA: 6.11 NG/ML (ref 1.33–7.78)
SEX HORMONE BINDING GLOBULIN: 171 NMOL/L (ref 30–135)
TESTOSTERONE FREE: 3.3 PG/ML (ref 0.8–7.4)
TESTOSTERONE, FEMALES/CHILDREN: 66 NG/DL (ref 9–55)

## 2021-07-02 LAB — THYROID PEROXIDASE (TPO) ABS: <4 IU/ML (ref 0–25)

## 2021-07-10 ENCOUNTER — HOSPITAL ENCOUNTER (OUTPATIENT)
Dept: ULTRASOUND IMAGING | Age: 31
Discharge: HOME | End: 2021-07-12
Payer: COMMERCIAL

## 2021-07-10 DIAGNOSIS — E07.9 THYROID DISEASE: ICD-10-CM

## 2021-07-10 PROCEDURE — 76536 US EXAM OF HEAD AND NECK: CPT

## 2021-07-13 NOTE — RESULT ENCOUNTER NOTE
Please notify Earlene Guerra that thyroid ultrasound results are overall normal.   Very small fluid filled cysts present but no follow up testing is advised.

## 2021-07-16 ENCOUNTER — OFFICE VISIT (OUTPATIENT)
Dept: ENDOCRINOLOGY | Age: 31
End: 2021-07-16
Payer: COMMERCIAL

## 2021-07-16 VITALS
DIASTOLIC BLOOD PRESSURE: 82 MMHG | OXYGEN SATURATION: 99 % | SYSTOLIC BLOOD PRESSURE: 108 MMHG | WEIGHT: 259 LBS | HEART RATE: 86 BPM | HEIGHT: 68 IN | BODY MASS INDEX: 39.25 KG/M2

## 2021-07-16 DIAGNOSIS — E28.2 PCOS (POLYCYSTIC OVARIAN SYNDROME): Primary | ICD-10-CM

## 2021-07-16 PROCEDURE — 99202 OFFICE O/P NEW SF 15 MIN: CPT | Performed by: PHYSICIAN ASSISTANT

## 2021-07-16 RX ORDER — SPIRONOLACTONE 50 MG/1
50 TABLET, FILM COATED ORAL 2 TIMES DAILY
Qty: 60 TABLET | Refills: 5 | Status: SHIPPED | OUTPATIENT
Start: 2021-07-16 | End: 2022-04-07

## 2021-07-16 ASSESSMENT — ENCOUNTER SYMPTOMS
VOMITING: 0
DIARRHEA: 0
SORE THROAT: 0
SHORTNESS OF BREATH: 0
SINUS PRESSURE: 0
EYE REDNESS: 0
WHEEZING: 0
EYE PAIN: 0
ABDOMINAL PAIN: 0
RHINORRHEA: 0
NAUSEA: 0
COUGH: 0

## 2021-07-16 NOTE — PROGRESS NOTES
7/16/2021    Assessment:       Diagnosis Orders   1. PCOS (polycystic ovarian syndrome)  Cortisol Am, Total    Comprehensive Metabolic Panel       PLAN:     1. Start spironolactone (Aldactone) 50 mg once a day   2. Increase to twice daily in 2 weeks   3. Repeat labs on week before your follow up visit, fasting in the morning, a.m. cortisol glucose and lipids  4. Follow up in 6 months       Orders Placed This Encounter   Procedures    Cortisol Am, Total     Standing Status:   Future     Standing Expiration Date:   7/16/2022    Comprehensive Metabolic Panel     Standing Status:   Future     Standing Expiration Date:   7/16/2022     Orders Placed This Encounter   Medications    spironolactone (ALDACTONE) 50 MG tablet     Sig: Take 1 tablet by mouth 2 times daily     Dispense:  60 tablet     Refill:  5     Return in about 6 months (around 1/16/2022) for PCOS. Subjective:     Chief Complaint   Patient presents with    New Patient     Vitals:    07/16/21 1210   BP: 108/82   Pulse: 86   SpO2: 99%   Weight: 259 lb (117.5 kg)   Height: 5' 8\" (1.727 m)     Wt Readings from Last 3 Encounters:   07/16/21 259 lb (117.5 kg)   06/25/21 255 lb (115.7 kg)   12/31/20 243 lb (110.2 kg)     BP Readings from Last 3 Encounters:   07/16/21 108/82   06/25/21 110/68   12/31/20 115/69     Patient is a 78-year-old -American female who presents for evaluation of her PCOS today. She was diagnosed approximately 20 years ago, has never taken any medication for that. She has had history of dysmenorrhea with 2 to 3 months between very heavy menstrual cycles. This has improved on her current OCP NuvaRing. Thorough review of laboratories and previous records show no hyperglycemia, hyper or hypocalcemia A1c is normal.  Vital signs are normal.  She denies any hair skin or nail changes. She has however been experiencing intermittent night sweats 4-5 times a week that seem to be getting worse in the last year.   Talked about the pathophysiology of PCOS the risk of diabetes due to insulin resistance and hyperlipidemia and hypercalcemia. We discussed the need for lifestyle changes including strict exercise regimen dietary restrictions. She verbalized understanding. Going to order nutritional services consultation. We discussed utilizing a GLP-1 or Adipex in the future for weight loss when she has a fitness program in place. Past Medical History:   Diagnosis Date    PCOS (polycystic ovarian syndrome)     Thyroid disease      Past Surgical History:   Procedure Laterality Date    LAPAROSCOPY       Social History     Socioeconomic History    Marital status: Single     Spouse name: Not on file    Number of children: Not on file    Years of education: Not on file    Highest education level: Not on file   Occupational History    Not on file   Tobacco Use    Smoking status: Never Smoker    Smokeless tobacco: Never Used   Substance and Sexual Activity    Alcohol use: Yes    Drug use: Never    Sexual activity: Not on file   Other Topics Concern    Not on file   Social History Narrative    Not on file     Social Determinants of Health     Financial Resource Strain: Low Risk     Difficulty of Paying Living Expenses: Not hard at all   Food Insecurity: No Food Insecurity    Worried About Running Out of Food in the Last Year: Never true    [address] in the Last Year: Never true   Transportation Needs: No Transportation Needs    Lack of Transportation (Medical): No    Lack of Transportation (Non-Medical):  No   Physical Activity:     Days of Exercise per Week:     Minutes of Exercise per Session:    Stress:     Feeling of Stress :    Social Connections:     Frequency of Communication with Friends and Family:     Frequency of Social Gatherings with Friends and Family:     Attends Anglican Services:     Active Member of Clubs or Organizations:     Attends Club or Organization Meetings:     Marital Status:    Intimate Partner Violence:     Fear of Current or Ex-Partner:     Emotionally Abused:     Physically Abused:     Sexually Abused:      Family History   Problem Relation Age of Onset    Cancer Mother         multiple myeloma    No Known Problems Father      Allergies   Allergen Reactions    Codeine        Current Outpatient Medications:     spironolactone (ALDACTONE) 50 MG tablet, Take 1 tablet by mouth 2 times daily, Disp: 60 tablet, Rfl: 5    etonogestrel-ethinyl estradiol (NUVARING) 0.12-0.015 MG/24HR vaginal ring, Place 1 each vaginally See Admin Instructions, Disp: , Rfl:   Lab Results   Component Value Date     06/26/2021    K 4.2 06/26/2021     06/26/2021    CO2 25 06/26/2021    BUN 11 06/26/2021    CREATININE 0.81 06/26/2021    GLUCOSE 99 06/26/2021    CALCIUM 10.1 (H) 06/26/2021    PROT 7.3 06/26/2021    LABALBU 4.1 06/26/2021    BILITOT <0.2 06/26/2021    ALKPHOS 41 06/26/2021    AST 15 06/26/2021    ALT 11 06/26/2021    LABGLOM >60.0 06/26/2021    GFRAA >60.0 06/26/2021    GLOB 3.2 06/26/2021     Lab Results   Component Value Date    WBC 5.7 06/26/2021    HGB 13.1 06/26/2021    HCT 39.6 06/26/2021    MCV 83.4 06/26/2021     06/26/2021     Lab Results   Component Value Date    LABA1C 5.4 06/26/2021   Results for Carrie Jarvis (MRN [id number]) as of 7/16/2021 12:28   Ref. Range 6/26/2021 10:36   Sex Hormone Binding Latest Ref Range: 30 - 135 nmol/L 171 (H)   Testosterone, Free Latest Ref Range: 0.8 - 7.4 pg/mL 3.3   Testosterone, Females/Children Latest Ref Range: 9 - 55 ng/dL 66 (H)   Results for Carrie Jarvis (MRN [id number]) as of 7/16/2021 12:28   Ref. Range 6/26/2021 10:36   Insulin Latest Ref Range: 2.6 - 24.9 uIU/mL 11.2   Results for Carrie Conley (MRN [id number]) as of 7/16/2021 12:28   Ref.  Range 6/26/2021 10:36   Prolactin Latest Units: ng/mL 20.0     Lab Results   Component Value Date    HDL 66 (H) 06/26/2021    LDLCALC 98 06/26/2021    CHOL 183 06/26/2021    TRIG 95 06/26/2021     No results found for: TESTM  Lab Results   Component Value Date    TSH 1.080 06/26/2021    T4FREE 1.36 06/26/2021     Lab Results   Component Value Date    TPOABS <4.0 06/26/2021       Review of Systems   Constitutional: Negative for chills, fatigue and fever. HENT: Negative for congestion, ear pain, postnasal drip, rhinorrhea, sinus pressure and sore throat. Eyes: Negative for pain and redness. Respiratory: Negative for cough, shortness of breath and wheezing. Cardiovascular: Negative for chest pain, palpitations and leg swelling. Gastrointestinal: Negative for abdominal pain, diarrhea, nausea and vomiting. Endocrine: Negative for cold intolerance and heat intolerance. No thyromegaly or palpable nodules, mild cervical fat pad with acanthosis around the cervical area   Genitourinary: Negative for difficulty urinating. Musculoskeletal: Negative for arthralgias. Skin: Negative for rash. Neurological: Negative for dizziness and headaches. Hematological: Negative for adenopathy. Psychiatric/Behavioral: Negative for dysphoric mood. Objective:   Physical Exam  Vitals reviewed. Constitutional:       Appearance: She is well-developed. HENT:      Head: Normocephalic and atraumatic. Nose: No congestion. Mouth/Throat:      Mouth: Mucous membranes are moist.   Eyes:      Conjunctiva/sclera: Conjunctivae normal.   Cardiovascular:      Rate and Rhythm: Normal rate and regular rhythm. Heart sounds: Normal heart sounds. Pulmonary:      Effort: Pulmonary effort is normal.      Breath sounds: Normal breath sounds. Abdominal:      General: Bowel sounds are normal.      Palpations: Abdomen is soft. Musculoskeletal:         General: Normal range of motion. Cervical back: Normal range of motion and neck supple. Skin:     General: Skin is warm and dry. Neurological:      Mental Status: She is alert and oriented to person, place, and time.    Psychiatric: Mood and Affect: Mood normal.

## 2021-07-16 NOTE — PATIENT INSTRUCTIONS
1. Start spironolactone (Aldactone) 50 mg once a day   2. Increase to twice daily in 2 weeks   3. Repeat labs on week before your follow up visit, fasting in the morning    4.  Follow up in 6 months

## 2021-09-15 ENCOUNTER — OFFICE VISIT (OUTPATIENT)
Dept: FAMILY MEDICINE CLINIC | Age: 31
End: 2021-09-15
Payer: COMMERCIAL

## 2021-09-15 VITALS
WEIGHT: 259.2 LBS | HEIGHT: 68 IN | OXYGEN SATURATION: 97 % | TEMPERATURE: 97.5 F | SYSTOLIC BLOOD PRESSURE: 120 MMHG | BODY MASS INDEX: 39.28 KG/M2 | DIASTOLIC BLOOD PRESSURE: 72 MMHG | HEART RATE: 84 BPM

## 2021-09-15 DIAGNOSIS — E28.2 PCOS (POLYCYSTIC OVARIAN SYNDROME): Primary | ICD-10-CM

## 2021-09-15 DIAGNOSIS — R42 VERTIGO: ICD-10-CM

## 2021-09-15 DIAGNOSIS — Z86.69 HISTORY OF RECURRENT EAR INFECTION: ICD-10-CM

## 2021-09-15 DIAGNOSIS — F33.1 MODERATE EPISODE OF RECURRENT MAJOR DEPRESSIVE DISORDER (HCC): ICD-10-CM

## 2021-09-15 DIAGNOSIS — R00.2 PALPITATIONS: ICD-10-CM

## 2021-09-15 DIAGNOSIS — H66.92 LEFT OTITIS MEDIA, UNSPECIFIED OTITIS MEDIA TYPE: ICD-10-CM

## 2021-09-15 DIAGNOSIS — H61.22 IMPACTED CERUMEN OF LEFT EAR: ICD-10-CM

## 2021-09-15 DIAGNOSIS — Z11.4 ENCOUNTER FOR SCREENING FOR HIV: ICD-10-CM

## 2021-09-15 DIAGNOSIS — F32.81 PMDD (PREMENSTRUAL DYSPHORIC DISORDER): ICD-10-CM

## 2021-09-15 PROCEDURE — 69210 REMOVE IMPACTED EAR WAX UNI: CPT | Performed by: NURSE PRACTITIONER

## 2021-09-15 PROCEDURE — 99214 OFFICE O/P EST MOD 30 MIN: CPT | Performed by: NURSE PRACTITIONER

## 2021-09-15 PROCEDURE — 93000 ELECTROCARDIOGRAM COMPLETE: CPT | Performed by: NURSE PRACTITIONER

## 2021-09-15 RX ORDER — AZITHROMYCIN 250 MG/1
TABLET, FILM COATED ORAL
Qty: 6 TABLET | Refills: 0 | Status: SHIPPED | OUTPATIENT
Start: 2021-09-15 | End: 2021-09-25

## 2021-09-15 RX ORDER — SERTRALINE HYDROCHLORIDE 25 MG/1
25 TABLET, FILM COATED ORAL DAILY
Qty: 30 TABLET | Refills: 3 | Status: SHIPPED | OUTPATIENT
Start: 2021-09-15 | End: 2021-10-07

## 2021-09-15 RX ORDER — ETONOGESTREL AND ETHINYL ESTRADIOL 11.7; 2.7 MG/1; MG/1
1 INSERT, EXTENDED RELEASE VAGINAL SEE ADMIN INSTRUCTIONS
Qty: 3 EACH | Status: CANCELLED | OUTPATIENT
Start: 2021-09-15

## 2021-09-15 NOTE — PROGRESS NOTES
Chief Complaint   Patient presents with    Follow-up     getting bloowork done and medication refill        HPI: Jose Munson is a 28 y.o. female presenting for follow-up of recent blood work and current symptoms:    PCOS: She states that she has establish care with endocrinology and has started Aldactone. She has not noticed any side effect with the medication. Will be following up again in about 4 more months. She has reviewed labs with Jordan Dickens and states understanding. Depression: The patient states that over the past 2 weeks She has experienced on going issues with a depressed mood and has experienced loss of interest in normally enjoyed activities. Depressed mood is worse in the in the afternoon. The appetite has not been affected with mood changes. Amount of hours of sleep per day is unchanged. The patient is notexperiencing any loss of fine motor skills and is  reporting any fatigue. There arereported changes in ability to concentrate or process information. There are not homicidal thoughts and are not suicidal thoughts present. Does endorse some negative thoughts of potential self-harm but does not have any plan. States these thoughts started when she switched over to the NuvaRing. She had been on oral birth control in the past.  She has noticed that her mood is much worse around the time of her menses and this has been the case for several years now. Never been formally diagnosed with depression and has never been on any medication for mood. She is willing to start medication if indicated. Vertigo/palpitations: She states that she continues to have issues with occasional episodes of vertigo and has noticed heart palpitations when these episodes occur. She has not felt that she would fall or pass out but episodes can be significant in nature. She remembers wearing some type of heart monitor several years ago. Has not had any formal cardiac diagnosis.   No major change in diet or activity lately. ROS: This patient reports no chest pain or pressure. There is no shortness of breath or cough. The patient reports no nausea or vomiting. There is no heartburn or indigestion. There is no diarrhea or constipation. No black, bloody, mucusy or tarry stool noticed. The patient reports no bloating and no change in appetite. There is no numbness, tingling or swelling in the extremities. EXAM:  Constitutional Blood pressure 120/72, pulse 84, temperature 97.5 °F (36.4 °C), temperature source Temporal, height 5' 8\" (1.727 m), weight 259 lb 3.2 oz (117.6 kg), SpO2 97 %, not currently breastfeeding. .  She has a normal affect, no acute distress, appears well developed and well nourished. Ears:  Canal- left-  occluded by cerumen  Neck:  neck- supple, no mass, non-tender and no bruits  Lungs:  Normal expansion. Clear to auscultation. No rales, rhonchi, or wheezing., No chest wall tenderness. Heart:  Heart sounds are normal.  Regular rate and rhythm without murmur, gallop or rub. Abdomen:  Soft, non-tender, normal bowel sounds. No bruits, organomegaly or masses. Extremities: Extremities warm to touch, wnl, with no edema. The Ekg interpretation:     normal sinus rhythm with a rate of 67  Axis is   Normal  QTc is  388  Intervals and Durations are unremarkable. No specific ST-T wave changes appreciated. No evidence of acute ischemia. Ear irrigation of the left ear done using warm water. The patient tolerated the procedure well and a Moderate amount of cerumen was extracted from the ear. After irrigation the ear canal and TM are intact. There is significant injection to the left TM. DIAGNOSIS:    Diagnosis Orders   1. PCOS (polycystic ovarian syndrome)  norethindrone-ethinyl estradiol-Fe (LO LOESTRIN FE) 1 MG-10 MCG / 10 MCG tablet   2. Moderate episode of recurrent major depressive disorder (HCC)  sertraline (ZOLOFT) 25 MG tablet   3.  Vertigo  Holter Monitor 48 Hour    ECHO Complete 2D W Doppler Mercy Urbano MD, Invasive Cardiology, [place]   4. Palpitations  Holter Monitor 48 Hour    ECHO Complete 2D W Doppler W Color    EKG 12 lead    EKG 12 lead    Tamie Maldonado MD, In[facility] Cardiology, [place]   5. PMDD (premenstrual dysphoric disorder)  norethindrone-ethinyl estradiol-Fe (LO LOESTRIN FE) 1 MG-10 MCG / 10 MCG tablet   6. Impacted cerumen of left ear  88049 - NC REMOVE IMPACTED EAR WAX   7. Encounter for screening for HIV  HIV Screen   8. Left otitis media, unspecified otitis media type  azithromycin (ZITHROMAX) 250 MG tablet   9. History of recurrent ear infection  FLO - Sherif Resendez MD, Otolaryngology, [facility]         PLAN: Include orders in the DX section. Follow up: 2 weeks and as needed. for doxy visit mood. Follow up in 6 weeks to review cardiac testing. 1.  She has established care with endocrinology and will be following up in the future. Tolerating Aldactone. 2.  5.  Discussed diagnosis of depression today as well as likely PMDD. Recommend starting low-dose Zoloft and switching from NuvaRing to oral birth control. Her symptoms became much more significant after changing her birth control in the past.  She is reminded to use backup method of birth control while on antibiotics with oral birth control. She will notify me if mood becomes unstable prior to her next visit. Discussed starting low dose anti-depressant for current symptoms. Discussed that the first 2 weeks are to monitor for side effects. At the 2 week follow up, the dose will be increased if no significant side effects are reported. Discussed possible side effects with most common side effects being GI related and headache which can be improved with taking medication with food and tylenol for headache; also discussed BBW of suicidiality. Discussed that it can take up to 6 weeks on a therapeutic dose of medication to reach peak effect.    The patient is advised to call with any questions or concerns and to avoid abruptly stopping the medication without notifying the office. The patient verbalizes understanding. 3.  4.  6.  8.   9. Recommend Holter monitor and echocardiogram as well as referral to cardiology for vertigo symptoms and heart palpitations. Discussed that vertigo may be somewhat secondary to current ear infection. Antibiotic ordered for ear infection as well as referral to for ENT specialist secondary to recurrent ear infections. She is aware of need to go to the emergency room if symptoms become more significant. Reviewed overall normal findings on EKG today. The patient is instructed to take Probiotic tablets twice a day for the duration of antibiotic therapy and for 4 days after completion of antibiotics. This will help restore the good bacteria to your colon and prevent side effects of antibiotic therapy such as cramping and diarrhea. Probiotic tablets can be found at your local pharmacy over the counter. Ask your pharmacist if you need help finding tablets. Please note this report has been partially produced using speech recognition software and may cause contain errors related to that system including grammar, punctuation and spelling as well as words and phrases that may seem inappropriate. If there are questions or concerns please feel free to contact me to clarify.         Electronically signed by GIGI Moore - CNP-CNP, 8:20 AM 9/16/21

## 2021-09-24 NOTE — ED TRIAGE NOTES
Right ankle pain after slipping on some steps this evening. Very slightly swollen, no obvious deformity. A/0 x3, resps even and unlabored on room air. Patient c/o pain with weight bearing. No

## 2021-10-07 DIAGNOSIS — F33.1 MODERATE EPISODE OF RECURRENT MAJOR DEPRESSIVE DISORDER (HCC): ICD-10-CM

## 2021-10-07 RX ORDER — SERTRALINE HYDROCHLORIDE 25 MG/1
25 TABLET, FILM COATED ORAL DAILY
Qty: 30 TABLET | Refills: 3 | Status: SHIPPED | OUTPATIENT
Start: 2021-10-07 | End: 2021-11-11 | Stop reason: SDUPTHER

## 2021-10-07 NOTE — TELEPHONE ENCOUNTER
Pharmacy is requesting medication refill.  Please approve or deny this request.    Rx requested:  Requested Prescriptions     Pending Prescriptions Disp Refills    sertraline (ZOLOFT) 25 MG tablet [Pharmacy Med Name: SERTRALINE HCL 25 MG TABLET] 30 tablet 3     Sig: Take 1 tablet by mouth daily         Last Office Visit:   9/15/2021      Next Visit Date:  Future Appointments   Date Time Provider Jony Stone   10/12/2021  8:00 AM MALZ ECHO  [address]   10/12/2021  9:00  Juan José   10/12/2021  4:00 PM MLOZ EKG  [address] [facility]   10/26/2021 12:00 PM Sabiha Reese MD [address]   11/11/2021 11:30 AM GIGI Lopez CNP MLOX [facility]   1/21/2022 11:40 AM Umair Gee, [address] [facility] [address]

## 2021-10-12 ENCOUNTER — HOSPITAL ENCOUNTER (OUTPATIENT)
Dept: NON INVASIVE DIAGNOSTICS | Age: 31
Discharge: HOME | End: 2021-10-12
Payer: COMMERCIAL

## 2021-10-12 DIAGNOSIS — R00.2 PALPITATIONS: ICD-10-CM

## 2021-10-12 DIAGNOSIS — R42 VERTIGO: ICD-10-CM

## 2021-10-12 LAB
LV EF: 65 %
LVEF MODALITY: NORMAL

## 2021-10-12 PROCEDURE — 93306 TTE W/DOPPLER COMPLETE: CPT

## 2021-10-12 PROCEDURE — 93226 XTRNL ECG REC<48 HR SCAN A/R: CPT

## 2021-10-12 PROCEDURE — 93225 XTRNL ECG REC<48 HRS REC: CPT

## 2021-10-13 NOTE — RESULT ENCOUNTER NOTE
This patient has an appointment scheduled on 11-11. We will discuss results at that visit. Results are normal overall.

## 2021-10-21 NOTE — PROCEDURES
[address] [address]                                 HOLTER MONITOR    PATIENT NAME: Aneesh Qureshi                  :        1990  MED REC NO:   [id number]                              ROOM:  ACCOUNT NO:   [de-identified]                           ADMIT DATE: 10/12/2021  PROVIDER:     Mikey Chester MD    HOLTER MONITOR 48-HOURS    DATE OF STUDY:  10/12/2021    INDICATION:  Palpitations. The patient's diary is reviewed. The patient has had several activities  listed, watching TV, driving to store and shopping at [employer].  She  correlates these activities with chest discomfort and dizziness. The patient was observed for 48 hours. Underlying electrocardiogram is sinus rhythm. Average heart rate is 74  beats per minute. Minimum heart rate of 45 beats per minute occurring  at 10:02 a.m. representing sinus bradycardia. Max heart rate is 138  beats per minute occurring at 02:29 p.m. representing sinus tachycardia. The patient has no ventricular ectopy. The patient has rare atrial  ectopy. No significant pauses. No atrial fibrillation nor flutter. IMPRESSION:  Benign Holter monitoring without any significant  arrhythmias.         Shashi Simms MD    D: 10/20/2021 16:24:20       T: 10/20/2021 16:28:26     [email or web address]  Job#: [id number]     Doc#: [id number]

## 2021-10-26 ENCOUNTER — OFFICE VISIT (OUTPATIENT)
Dept: CARDIOLOGY CLINIC | Age: 31
End: 2021-10-26
Payer: COMMERCIAL

## 2021-10-26 VITALS
WEIGHT: 250 LBS | HEART RATE: 70 BPM | DIASTOLIC BLOOD PRESSURE: 66 MMHG | SYSTOLIC BLOOD PRESSURE: 124 MMHG | HEIGHT: 68 IN | OXYGEN SATURATION: 99 % | BODY MASS INDEX: 37.89 KG/M2

## 2021-10-26 DIAGNOSIS — R94.31 ABNORMAL EKG: Primary | ICD-10-CM

## 2021-10-26 DIAGNOSIS — R00.2 PALPITATIONS: ICD-10-CM

## 2021-10-26 PROCEDURE — 99244 OFF/OP CNSLTJ NEW/EST MOD 40: CPT | Performed by: INTERNAL MEDICINE

## 2021-10-26 ASSESSMENT — ENCOUNTER SYMPTOMS
WHEEZING: 0
ABDOMINAL DISTENTION: 0
TROUBLE SWALLOWING: 0
VOICE CHANGE: 0
GASTROINTESTINAL NEGATIVE: 1
ANAL BLEEDING: 0
NAUSEA: 0
COLOR CHANGE: 0
ALLERGIC/IMMUNOLOGIC NEGATIVE: 1
SHORTNESS OF BREATH: 0
VOMITING: 0
APNEA: 0
BLOOD IN STOOL: 0
CHEST TIGHTNESS: 0
FACIAL SWELLING: 0
EYES NEGATIVE: 1

## 2021-10-26 NOTE — PROGRESS NOTES
[facility] CARDIOLOGY OFFICE FOLLOW-UP      Patient: Itz Borges  YOB: 1990  MRN: [id number]    Chief Complaint:  Chief Complaint   Patient presents with    New Patient     Demetrio Munoz referred    Palpitations         Subjective/HPI:  10/26/21: Patient presents today for evaluation of palpitation. Very pleasant 26-year-old -American female who works for city  [employer]. She has been complaining of palpitations. They last a few seconds. Random. Never had syncope. No headache nausea vomiting. She has a history of polycystic ovarian syndrome and is on Aldactone 50 mg twice a day. Denies excessive caffeine abuse denies alcohol abuse denies illicit drug abuse. Echo showed normal left KJ fraction. EJ fraction 65%. Valves are normal.  No history of headaches seizures fever or chills. No history of rheumatic fever echo was unremarkable with 50 PACs no PVCs no pauses.   No claudication       Past Medical History:   Diagnosis Date    PCOS (polycystic ovarian syndrome)     Thyroid disease        Past Surgical History:   Procedure Laterality Date    LAPAROSCOPY         Family History   Problem Relation Age of Onset   Rebbecca Hinders Cancer Mother         multiple myeloma    No Known Problems Father     Stroke Maternal Aunt        Social History     Socioeconomic History    Marital status: Single     Spouse name: None    Number of children: None    Years of education: None    Highest education level: None   Occupational History    None   Tobacco Use    Smoking status: Never Smoker    Smokeless tobacco: Never Used   Substance and Sexual Activity    Alcohol use: Yes     Comment: socially    Drug use: Never    Sexual activity: None   Other Topics Concern    None   Social History Narrative    None     Social Determinants of Health     Financial Resource Strain: Low Risk     Difficulty of Paying Living Expenses: Not hard at all   Food Insecurity: No Food Insecurity    Worried About Running Out of Food in the Last Year: Never true    Ran Out of Food in the Last Year: Never true   Transportation Needs: No Transportation Needs    Lack of Transportation (Medical): No    Lack of Transportation (Non-Medical): No   Physical Activity:     Days of Exercise per Week:     Minutes of Exercise per Session:    Stress:     Feeling of Stress :    Social Connections:     Frequency of Communication with Friends and Family:     Frequency of Social Gatherings with Friends and Family:     Attends Jewish Services:     Active Member of Clubs or Organizations:     Attends Club or Organization Meetings:     Marital Status:    Intimate Partner Violence:     Fear of Current or Ex-Partner:     Emotionally Abused:     Physically Abused:     Sexually Abused: Allergies   Allergen Reactions    Codeine        Current Outpatient Medications   Medication Sig Dispense Refill    metoprolol tartrate (LOPRESSOR) 25 MG tablet Take 1 tablet by mouth daily 30 tablet 0    sertraline (ZOLOFT) 25 MG tablet Take 1 tablet by mouth daily 30 tablet 3    norethindrone-ethinyl estradiol-Fe (LO LOESTRIN FE) 1 MG-10 MCG / 10 MCG tablet Take 1 tablet by mouth daily 28 tablet 3    spironolactone (ALDACTONE) 50 MG tablet Take 1 tablet by mouth 2 times daily 60 tablet 5     No current facility-administered medications for this visit. Review of Systems:   Review of Systems   Constitutional: Positive for fatigue. Negative for activity change, appetite change, diaphoresis and unexpected weight change. HENT: Negative. Negative for facial swelling, nosebleeds, trouble swallowing and voice change. Eyes: Negative. Respiratory: Negative for apnea, chest tightness, shortness of breath and wheezing. Cardiovascular: Positive for palpitations. Negative for chest pain and leg swelling. Gastrointestinal: Negative. Negative for abdominal distention, anal bleeding, blood in stool, nausea and vomiting.    Endocrine: Negative. Genitourinary: Negative. Negative for decreased urine volume and dysuria. Musculoskeletal: Negative for gait problem and myalgias. Skin: Negative. Negative for color change, pallor, rash and wound. Allergic/Immunologic: Negative. Neurological: Positive for dizziness. Negative for syncope, facial asymmetry, weakness, light-headedness, numbness and headaches. Hematological: Does not bruise/bleed easily. Psychiatric/Behavioral: Negative. Negative for agitation, behavioral problems, confusion, hallucinations and suicidal ideas. The patient is not nervous/anxious. All other systems reviewed and are negative. Review of System is negative except for as mentioned above. Physical Examination:    /66 (Site: Left Upper Arm, Position: Sitting, Cuff Size: Large Adult)   Pulse 70   Ht 5' 8\" (1.727 m)   Wt 250 lb (113.4 kg)   SpO2 99%   BMI 38.01 kg/m²    Physical Exam   Constitutional: She appears healthy. No distress. HENT:   Nose: Nose normal.   Mouth/Throat: Dentition is normal. Oropharynx is clear. Eyes: Pupils are equal, round, and reactive to light. Conjunctivae are normal.   Neck: Thyroid normal.   Cardiovascular: Regular rhythm, S1 normal, S2 normal, normal heart sounds, intact distal pulses and normal pulses. PMI is not displaced. No murmur heard. Pulmonary/Chest: She has no wheezes. She has no rales. She exhibits no tenderness. Abdominal: Soft. Bowel sounds are normal. She exhibits no distension and no mass. There is no splenomegaly or hepatomegaly. There is no abdominal tenderness. No hernia. Musculoskeletal:      Cervical back: Normal range of motion and neck supple. Neurological: She is alert and oriented to person, place, and time. She has normal motor skills. Gait normal.   Skin: Skin is warm and dry. No cyanosis. No jaundice. Nails show no clubbing.            Patient Active Problem List   Diagnosis    PCOS (polycystic ovarian syndrome)    Thyroid disease                Assessment/Orders:   I had a detailed discussion with her. The episodes of palpitations are brief lasting few seconds. Never had any syncope dizziness they are random. The treatment may carry more risk than potential benefits. I am going to get a regular stress test on her to see what happens if she gets any significant arrhythmias on exercise. Also I am going to prescribe her Lopressor 25 mg for as needed use. Reduce caffeine intake. Exercise regularly and reduce calorie intake          Plan:       Palpitations       Rare PACs on Holter.   No ventricular or supraventricular tachyarrhythmias no significant bradycardia arrhythmias       Normal cardiac valves normal LV ejection fraction      Regular stress test to evaluate for arrhythmias  Lopressor 25 mg as needed        See me in 1m        Electronically signed by: Kana Callahan MD  10/27/2021 2:07 PM

## 2021-11-11 ENCOUNTER — VIRTUAL VISIT (OUTPATIENT)
Dept: FAMILY MEDICINE CLINIC | Age: 31
End: 2021-11-11
Payer: COMMERCIAL

## 2021-11-11 DIAGNOSIS — F33.1 MODERATE EPISODE OF RECURRENT MAJOR DEPRESSIVE DISORDER (HCC): Primary | ICD-10-CM

## 2021-11-11 DIAGNOSIS — F32.81 PMDD (PREMENSTRUAL DYSPHORIC DISORDER): ICD-10-CM

## 2021-11-11 DIAGNOSIS — N92.6 IRREGULAR MENSES: ICD-10-CM

## 2021-11-11 DIAGNOSIS — R00.2 PALPITATIONS: ICD-10-CM

## 2021-11-11 DIAGNOSIS — E28.2 PCOS (POLYCYSTIC OVARIAN SYNDROME): ICD-10-CM

## 2021-11-11 DIAGNOSIS — R42 VERTIGO: ICD-10-CM

## 2021-11-11 PROCEDURE — 99214 OFFICE O/P EST MOD 30 MIN: CPT | Performed by: NURSE PRACTITIONER

## 2021-11-11 ASSESSMENT — PATIENT HEALTH QUESTIONNAIRE - PHQ9
SUM OF ALL RESPONSES TO PHQ QUESTIONS 1-9: 2
SUM OF ALL RESPONSES TO PHQ QUESTIONS 1-9: 2
SUM OF ALL RESPONSES TO PHQ9 QUESTIONS 1 & 2: 2
2. FEELING DOWN, DEPRESSED OR HOPELESS: 1
1. LITTLE INTEREST OR PLEASURE IN DOING THINGS: 1
SUM OF ALL RESPONSES TO PHQ QUESTIONS 1-9: 2

## 2021-11-11 NOTE — PROGRESS NOTES
2021    TELEHEALTH EVALUATION -- Audio/Visual (During QVVSM-38 public health emergency)    Due to COVID 19 outbreak, patient's office visit was converted to a virtual visit. Patient was contacted and agreed to proceed with a virtual visit via Doxy. me  The risks and benefits of converting to a virtual visit were discussed in light of the current infectious disease epidemic. Patient also understood that insurance coverage and co-pays are up to their individual insurance plans. Keli Pisano, was evaluated through a synchronous (real-time) audio-video encounter. The patient (or guardian if applicable) is aware that this is a billable service. Verbal consent to proceed has been obtained within the past 12 months. The visit was conducted pursuant to the emergency declaration under the [address] [place] [address], [address] [place] [address] and the [employer] and [employer] General Act. Patient identification was verified, and a caregiver was present when appropriate. The patient was located in a state where the provider was credentialed to provide care. Total time spent for this encounter: Not billed by time    The patient is talking with me virtually from her home and I am located at my office in [facility]. HPI:    Keli Pisano (:  1990) has requested an audio/video evaluation for the following concern(s):    Depression: iMchael Farris reports being in a fair mood that is stable. The patient is not reporting insomnia, difficulty concentrating and usual interest in activities. This patient is not homicidal or suicidal.  She states that she has not had any side effects with Zoloft 25 mg and has maybe noticed is mild improvement in her mood but overall she continues to have significant issues with depression especially around the time of her menses. She is interested in a dose increase of medication.     Palpitations/vertigo: has not had any severe episodes lately. He states that she has followed with cardiology and had Holter monitor and echocardiogram done. Told that there were no worrisome findings made. She does recall brief mentioning of a stress test but has not yet received a call to schedule. She does have an appointment set up next month to follow with cardiology again. Irregular menses/PCOS: She states that she has started taking the birth control. She has not had a menses however since she started it. She has an appointment scheduled with endocrinology in January to follow-up for hormone check. She has been tolerating medication with no significant side effects reported. Review of Systems   This patient reports no chest pain or pressure. There is no shortness of breath or cough. The patient reports no nausea or vomiting. There is no heartburn or indigestion. There is no diarrhea or constipation. No black, bloody, mucusy or tarry stool noticed. The patient reports no bloating and no change in appetite. There is no numbness, tingling or swelling in the extremities. Prior to Visit Medications    Medication Sig Taking?  Authorizing Provider   sertraline (ZOLOFT) 50 MG tablet Take 1 tablet by mouth daily Yes GIGI Nazario CNP   metoprolol tartrate (LOPRESSOR) 25 MG tablet Take 1 tablet by mouth daily Yes Moreno Giles MD   norethindrone-ethinyl estradiol-Fe (LO LOESTRIN FE) 1 MG-10 MCG / 10 MCG tablet Take 1 tablet by mouth daily Yes GIGI Nazario CNP   spironolactone (ALDACTONE) 50 MG tablet Take 1 tablet by mouth 2 times daily Yes ROBYN Duncan       Social History     Tobacco Use    Smoking status: Never Smoker    Smokeless tobacco: Never Used   Substance Use Topics    Alcohol use: Yes     Comment: socially    Drug use: Never        PHYSICAL EXAMINATION:  [ INSTRUCTIONS:  \"[x]\" Indicates a positive item  \"[]\" Indicates a negative item  -- DELETE ALL ITEMS NOT EXAMINED]  [x] Alert  [x] Oriented to person/place/time    [x] No apparent distress  [] Toxic appearing    [] Face flushed appearing [] Sclera clear  [] Lips are cyanotic      [x] Breathing appears normal  [] Appears tachypneic      [] Rash on visible skin    [x] Cranial Nerves II-XII grossly intact    [x] Motor grossly intact in visible upper extremities    [] Motor grossly intact in visible lower extremities    [x] Normal Mood  [] Anxious appearing    [] Depressed appearing  [] Confused appearing      [] Poor short term memory  [] Poor long term memory     [] OTHER:      Due to this being a TeleHealth encounter, evaluation of the following organ systems is limited: Vitals/Constitutional/EENT/Resp/CV/GI//MS/Neuro/Skin/Heme-Lymph-Imm. ASSESSMENT/PLAN:   Diagnosis Orders   1. Moderate episode of recurrent major depressive disorder (HCC)  sertraline (ZOLOFT) 50 MG tablet   2. Palpitations     3. Vertigo     4. PMDD (premenstrual dysphoric disorder)     5. PCOS (polycystic ovarian syndrome)     6. Irregular menses           Return in about 6 weeks (around 12/23/2021) for depression- doxy. 1. 4. Zoloft dose increased to 50 mg. Notify me if mood becomes unstable prior to next visit. 2. 3. Continue to follow with cardiology. Stress test order is pending. No significant symptoms since our last visit. 4. 5. 6. Continue current birth control. Discussed that periods can be irregular for the first several months after starting birth control. Some women do not have a menses while on oral contraceptives as well. Notify me if she would like to try a different birth control. Continue to follow with [facility] endocrinology. Please note this report has been partially produced using speech recognition software and may cause contain errors related to that system including grammar, punctuation and spelling as well as words and phrases that may seem inappropriate.  If there are questions or concerns please feel free to contact me to

## 2021-11-23 NOTE — TELEPHONE ENCOUNTER
Requesting medication refill.  Please approve or deny this request.    Rx requested:  Requested Prescriptions     Pending Prescriptions Disp Refills    metoprolol tartrate (LOPRESSOR) 25 MG tablet [Pharmacy Med Name: METOPROLOL TARTRATE 25 MG TAB] 30 tablet 0     Sig: TAKE 1 TABLET BY MOUTH EVERY DAY       Last Office Visit:   10/26/2021    Last Filled:      Last Labs:      Next Visit Date:  Future Appointments   Date Time Provider Jony Stone   12/14/2021  1:00 PM James Cervantes MD [address]   1/21/2022 11:40 AM Jae Hooks, [address] [place] [address]

## 2021-12-06 DIAGNOSIS — F33.1 MODERATE EPISODE OF RECURRENT MAJOR DEPRESSIVE DISORDER (HCC): ICD-10-CM

## 2021-12-06 NOTE — TELEPHONE ENCOUNTER
pharmacy requesting medication refill.  Please approve or deny this request.    Rx requested:  Requested Prescriptions     Pending Prescriptions Disp Refills    sertraline (ZOLOFT) 50 MG tablet [Pharmacy Med Name: SERTRALINE HCL 50 MG TABLET] 30 tablet 1     Sig: TAKE 1 TABLET BY MOUTH EVERY DAY         Last Office Visit:   11/11/2021      Next Visit Date:  Future Appointments   Date Time Provider Jony Stone   12/14/2021  1:00 PM Felton Stevens MD [address]   1/21/2022 11:40 AM Maycol Bonilla, [address] [place] [address]

## 2021-12-09 NOTE — TELEPHONE ENCOUNTER
patient requesting medication refill. Please approve or deny this request. Patient is requesting a 90 day supply.     Rx requested:  Requested Prescriptions     Pending Prescriptions Disp Refills    metoprolol tartrate (LOPRESSOR) 25 MG tablet 30 tablet 0     Sig: TAKE 1 TABLET BY MOUTH EVERY DAY         Last Office Visit:   11/11/2021      Next Visit Date:  Future Appointments   Date Time Provider Jony Stone   1/21/2022 11:40 AM Param Su, [address]

## 2021-12-29 DIAGNOSIS — F33.1 MODERATE EPISODE OF RECURRENT MAJOR DEPRESSIVE DISORDER (HCC): ICD-10-CM

## 2021-12-30 NOTE — TELEPHONE ENCOUNTER
Requesting medication refill.  Please approve or deny this request.    Rx requested:  Requested Prescriptions     Pending Prescriptions Disp Refills    sertraline (ZOLOFT) 50 MG tablet [Pharmacy Med Name: SERTRALINE HCL 50 MG TABLET] 30 tablet 1     Sig: TAKE 1 TABLET BY MOUTH EVERY DAY       Last Office Visit:   11/11/2021    Last Filled:      Last Labs:      Next Visit Date:  Future Appointments   Date Time Provider Jony Stone   1/21/2022 11:40 AM ROBYN Zabala [address]

## 2022-01-25 ENCOUNTER — VIRTUAL VISIT (OUTPATIENT)
Dept: FAMILY MEDICINE CLINIC | Age: 32
End: 2022-01-25
Payer: COMMERCIAL

## 2022-01-25 DIAGNOSIS — J10.1 INFLUENZA A: Primary | ICD-10-CM

## 2022-01-25 DIAGNOSIS — B34.9 NONSPECIFIC SYNDROME SUGGESTIVE OF VIRAL ILLNESS: ICD-10-CM

## 2022-01-25 LAB
INFLUENZA A ANTIBODY: ABNORMAL
INFLUENZA B ANTIBODY: ABNORMAL
Lab: NORMAL
PERFORMING INSTRUMENT: NORMAL
QC PASS/FAIL: NORMAL
SARS-COV-2, POC: NORMAL

## 2022-01-25 PROCEDURE — 87804 INFLUENZA ASSAY W/OPTIC: CPT | Performed by: PHYSICIAN ASSISTANT

## 2022-01-25 PROCEDURE — 99213 OFFICE O/P EST LOW 20 MIN: CPT | Performed by: PHYSICIAN ASSISTANT

## 2022-01-25 PROCEDURE — 87426 SARSCOV CORONAVIRUS AG IA: CPT | Performed by: PHYSICIAN ASSISTANT

## 2022-01-25 RX ORDER — BENZONATATE 100 MG/1
100 CAPSULE ORAL 2 TIMES DAILY PRN
Qty: 20 CAPSULE | Refills: 0 | Status: SHIPPED | OUTPATIENT
Start: 2022-01-25 | End: 2022-02-01

## 2022-01-25 RX ORDER — GUAIFENESIN 600 MG/1
600 TABLET, EXTENDED RELEASE ORAL 2 TIMES DAILY
Qty: 30 TABLET | Refills: 0 | Status: SHIPPED | OUTPATIENT
Start: 2022-01-25 | End: 2022-02-09

## 2022-01-25 RX ORDER — OSELTAMIVIR PHOSPHATE 75 MG/1
75 CAPSULE ORAL 2 TIMES DAILY
Qty: 10 CAPSULE | Refills: 0 | Status: SHIPPED | OUTPATIENT
Start: 2022-01-25 | End: 2022-01-30

## 2022-01-25 RX ORDER — ALBUTEROL SULFATE 90 UG/1
2 AEROSOL, METERED RESPIRATORY (INHALATION) EVERY 6 HOURS PRN
Qty: 1 EACH | Refills: 2 | Status: SHIPPED | OUTPATIENT
Start: 2022-01-25 | End: 2022-06-10

## 2022-01-25 ASSESSMENT — ENCOUNTER SYMPTOMS
DIARRHEA: 0
COUGH: 1
CHEST TIGHTNESS: 1
VOMITING: 0
SINUS PRESSURE: 1
NAUSEA: 1

## 2022-01-25 NOTE — PROGRESS NOTES
Subjective:      Patient ID: Sushila Fitch is a 28 y.o. female who presents today for:  Chief Complaint   Patient presents with    Congestion     Patient states that sx started friday night with sore throat and cough Patient states that she has some decresed smell        Pt consents to this telephone/video encounter and understands that examination is limited due to technology. 20 minutes were spent reviewing patient's records, test results, medications and allergies as well as ROS, counseling pt and explaining necessary plan of care during encounter. Pt endorses the following sx that began Fri, 1/21/22:  + prod cough, fatigue, nausea, HA, sinus pressure,   +chest tightness, flushing. She is unsure whether she had a fever  Pt is vaccinated for covid and flu  Unknown whether she has been exposed to either.             Past Medical History:   Diagnosis Date    PCOS (polycystic ovarian syndrome)     Thyroid disease      Past Surgical History:   Procedure Laterality Date    LAPAROSCOPY       Family History   Problem Relation Age of Onset   [de-identified] Cancer Mother         multiple myeloma    No Known Problems Father     Stroke Maternal Aunt      Social History     Socioeconomic History    Marital status: Single     Spouse name: Not on file    Number of children: Not on file    Years of education: Not on file    Highest education level: Not on file   Occupational History    Not on file   Tobacco Use    Smoking status: Never Smoker    Smokeless tobacco: Never Used   Substance and Sexual Activity    Alcohol use: Yes     Comment: socially    Drug use: Never    Sexual activity: Not on file   Other Topics Concern    Not on file   Social History Narrative    Not on file     Social Determinants of Health     Financial Resource Strain: Low Risk     Difficulty of Paying Living Expenses: Not hard at all   Food Insecurity: No Food Insecurity    Worried About [address] in the Last Year: Never true   [employer] Ran Out of Food in the Last Year: Never true   Transportation Needs: No Transportation Needs    Lack of Transportation (Medical): No    Lack of Transportation (Non-Medical): No   Physical Activity:     Days of Exercise per Week: Not on file    Minutes of Exercise per Session: Not on file   Stress:     Feeling of Stress : Not on file   Social Connections:     Frequency of Communication with Friends and Family: Not on file    Frequency of Social Gatherings with Friends and Family: Not on file    Attends Rastafari Services: Not on file    Active Member of [employer] or Organizations: Not on file    Attends Club or Organization Meetings: Not on file    Marital Status: Not on file   Intimate Partner Violence:     Fear of Current or Ex-Partner: Not on file    Emotionally Abused: Not on file    Physically Abused: Not on file    Sexually Abused: Not on file   Housing Stability:     Unable to Pay for Housing in the Last Year: Not on file    Number of Places Lived in the Last Year: Not on file    Unstable Housing in the Last Year: Not on file     Current Outpatient Medications on File Prior to Visit   Medication Sig Dispense Refill    LO LOESTRIN FE 1 MG-10 MCG / 10 MCG tablet TAKE 1 TABLET BY MOUTH EVERY DAY 28 tablet 0    sertraline (ZOLOFT) 50 MG tablet TAKE 1 TABLET BY MOUTH EVERY DAY 30 tablet 1    metoprolol tartrate (LOPRESSOR) 25 MG tablet TAKE 1 TABLET BY MOUTH EVERY DAY 90 tablet 1    spironolactone (ALDACTONE) 50 MG tablet Take 1 tablet by mouth 2 times daily 60 tablet 5     No current facility-administered medications on file prior to visit. Codeine    Review of Systems   Constitutional: Positive for chills and fatigue. HENT: Positive for congestion and sinus pressure. Respiratory: Positive for cough and chest tightness. Cardiovascular: Negative for chest pain. Gastrointestinal: Positive for nausea. Negative for diarrhea and vomiting. Neurological: Positive for headaches.    All other systems reviewed and are negative. Objective: There were no vitals taken for this visit. Physical Exam  Vitals reviewed: deferred d/t VV. Assessment:       Diagnosis Orders   1. Influenza A  albuterol sulfate  (90 Base) MCG/ACT inhaler    guaiFENesin (MUCINEX) 600 MG extended release tablet    oseltamivir (TAMIFLU) 75 MG capsule    benzonatate (TESSALON) 100 MG capsule   2. Nonspecific syndrome suggestive of viral illness  POCT COVID-19, Antigen    POCT Influenza A/B       Plan:      Orders Placed This Encounter   Procedures    POCT COVID-19, Antigen     Order Specific Question:   Is this test for diagnosis or screening? Answer:   Diagnosis of ill patient     Order Specific Question:   Symptomatic for COVID-19 as defined by CDC? Answer:   Yes     Order Specific Question:   Date of Symptom Onset     Answer:   1/21/2022     Order Specific Question:   Hospitalized for COVID-19? Answer:   No     Order Specific Question:   Admitted to ICU for COVID-19? Answer:   No     Order Specific Question:   Employed in healthcare setting? Answer:   Unknown     Order Specific Question:   Resident in a congregate (group) care setting? Answer:   Unknown     Order Specific Question:   Pregnant? Answer:   Unknown     Order Specific Question:   Previously tested for COVID-19? Answer:    Yes    POCT Influenza A/B       Orders Placed This Encounter   Medications    albuterol sulfate  (90 Base) MCG/ACT inhaler     Sig: Inhale 2 puffs into the lungs every 6 hours as needed for Wheezing     Dispense:  1 each     Refill:  2    guaiFENesin (MUCINEX) 600 MG extended release tablet     Sig: Take 1 tablet by mouth 2 times daily for 15 days     Dispense:  30 tablet     Refill:  0    oseltamivir (TAMIFLU) 75 MG capsule     Sig: Take 1 capsule by mouth 2 times daily for 5 days     Dispense:  10 capsule     Refill:  0    benzonatate (TESSALON) 100 MG capsule     Sig: Take 1 capsule by mouth 2 times daily as needed for Cough     Dispense:  20 capsule     Refill:  0       Return if symptoms worsen or fail to improve, for follow up w/PCP in 1-2 weeks. Reviewed with the patient: current clinicalstatus, medications, activities and diet. Side effects, adverse effects of the medication prescribedtoday, as well as treatment plan/ rationale and result expectations have been discussedwith the patient who expresses understanding and desires to proceed. Close follow upto evaluate treatment results and for coordination of care. I have reviewedthe patient's medical history in detail and updated the computerized patient record.     Oliverio Mcnamara PA-C

## 2022-01-25 NOTE — LETTER
[address]  Phone: [phone]  Fax: [phone]    Tyrel Dawkins        January 25, 2022     Patient: Peyton Stuart   YOB: 1990   Date of Visit: 1/25/2022       To Whom It May Concern:    Andres Jay was seen in our clinic on 1/25/22. It is my medical opinion that Andres Jay should refrain from participation until 1/28/22. If you have any questions or concerns, please don't hesitate to call.     Sincerely,        Daysi Arrieta PA-C

## 2022-01-28 DIAGNOSIS — F32.81 PMDD (PREMENSTRUAL DYSPHORIC DISORDER): ICD-10-CM

## 2022-01-28 DIAGNOSIS — E28.2 PCOS (POLYCYSTIC OVARIAN SYNDROME): ICD-10-CM

## 2022-01-28 RX ORDER — NORETHINDRONE ACETATE AND ETHINYL ESTRADIOL, ETHINYL ESTRADIOL AND FERROUS FUMARATE 1MG-10(24)
1 KIT ORAL DAILY
Qty: 28 TABLET | Refills: 0 | Status: SHIPPED | OUTPATIENT
Start: 2022-01-28 | End: 2022-02-23

## 2022-01-28 NOTE — TELEPHONE ENCOUNTER
Pharmacy is requesting medication refill.  Please approve or deny this request.    Rx requested:  Requested Prescriptions     Pending Prescriptions Disp Refills    norethindrone-ethinyl estradiol-Fe (LO LOESTRIN FE) 1 MG-10 MCG / 10 MCG tablet [Pharmacy Med Name: Audrey Garcia FE 1-10 TABLET] 28 tablet 0     Sig: Take 1 tablet by mouth daily         Last Office Visit:   Visit date not found      Next Visit Date:  Future Appointments   Date Time Provider Jony Stone   2/11/2022  2:00 PM Jaylene Cervantes, [address]

## 2022-02-01 DIAGNOSIS — F33.1 MODERATE EPISODE OF RECURRENT MAJOR DEPRESSIVE DISORDER (HCC): ICD-10-CM

## 2022-02-01 NOTE — TELEPHONE ENCOUNTER
Comments:     Last Office Visit (last PCP visit):   11/11/2021    Next Visit Date:  Future Appointments   Date Time Provider Jony Stone   2/11/2022  2:00 PM Maria L Rodriguez, [address]       **If hasn't been seen in over a year OR hasn't followed up according to last diabetes/ADHD visit, make appointment for patient before sending refill to provider.     Rx requested:  Requested Prescriptions     Pending Prescriptions Disp Refills    sertraline (ZOLOFT) 50 MG tablet [Pharmacy Med Name: SERTRALINE HCL 50 MG TABLET] 30 tablet 1     Sig: TAKE 1 TABLET BY MOUTH EVERY DAY

## 2022-02-10 ENCOUNTER — HOSPITAL ENCOUNTER (OUTPATIENT)
Dept: LAB | Age: 32
Discharge: HOME | End: 2022-02-10
Payer: COMMERCIAL

## 2022-02-10 DIAGNOSIS — E28.2 PCOS (POLYCYSTIC OVARIAN SYNDROME): ICD-10-CM

## 2022-02-10 LAB
ALBUMIN SERPL-MCNC: 4.1 G/DL (ref 3.5–4.6)
ALP BLD-CCNC: 48 U/L (ref 40–130)
ALT SERPL-CCNC: 16 U/L (ref 0–33)
ANION GAP SERPL CALCULATED.3IONS-SCNC: 13 MEQ/L (ref 9–15)
AST SERPL-CCNC: 19 U/L (ref 0–35)
BILIRUB SERPL-MCNC: <0.2 MG/DL (ref 0.2–0.7)
BUN BLDV-MCNC: 12 MG/DL (ref 6–20)
CALCIUM SERPL-MCNC: 9.4 MG/DL (ref 8.5–9.9)
CHLORIDE BLD-SCNC: 100 MEQ/L (ref 95–107)
CO2: 24 MEQ/L (ref 20–31)
CREAT SERPL-MCNC: 0.86 MG/DL (ref 0.5–0.9)
GFR AFRICAN AMERICAN: >60
GFR NON-AFRICAN AMERICAN: >60
GLOBULIN: 3.2 G/DL (ref 2.3–3.5)
GLUCOSE BLD-MCNC: 93 MG/DL (ref 70–99)
POTASSIUM SERPL-SCNC: 4.5 MEQ/L (ref 3.4–4.9)
SODIUM BLD-SCNC: 137 MEQ/L (ref 135–144)
TOTAL PROTEIN: 7.3 G/DL (ref 6.3–8)

## 2022-02-10 PROCEDURE — 80053 COMPREHEN METABOLIC PANEL: CPT

## 2022-02-10 PROCEDURE — 36415 COLL VENOUS BLD VENIPUNCTURE: CPT

## 2022-02-10 PROCEDURE — 82533 TOTAL CORTISOL: CPT

## 2022-02-11 ENCOUNTER — OFFICE VISIT (OUTPATIENT)
Dept: ENDOCRINOLOGY | Age: 32
End: 2022-02-11
Payer: COMMERCIAL

## 2022-02-11 VITALS
OXYGEN SATURATION: 99 % | SYSTOLIC BLOOD PRESSURE: 100 MMHG | DIASTOLIC BLOOD PRESSURE: 62 MMHG | HEIGHT: 68 IN | BODY MASS INDEX: 38.8 KG/M2 | HEART RATE: 71 BPM | WEIGHT: 256 LBS

## 2022-02-11 DIAGNOSIS — E28.2 PCOS (POLYCYSTIC OVARIAN SYNDROME): Primary | ICD-10-CM

## 2022-02-11 LAB
CORTISOL COLLECTION INFO: NORMAL
CORTISOL: 14.4 UG/DL (ref 2.7–18.4)

## 2022-02-11 PROCEDURE — 99213 OFFICE O/P EST LOW 20 MIN: CPT | Performed by: PHYSICIAN ASSISTANT

## 2022-02-11 ASSESSMENT — ENCOUNTER SYMPTOMS
SORE THROAT: 0
RHINORRHEA: 0
SINUS PRESSURE: 0
WHEEZING: 0
SHORTNESS OF BREATH: 0
ABDOMINAL PAIN: 0
NAUSEA: 0
DIARRHEA: 0
VOMITING: 0
EYE REDNESS: 0
EYE PAIN: 0
COUGH: 0

## 2022-02-11 NOTE — PROGRESS NOTES
2/11/2022    Assessment:       Diagnosis Orders   1. PCOS (polycystic ovarian syndrome)  Cortisol Total    Comprehensive Metabolic Panel    Hemoglobin A1C    ACTH    Insulin Free & Total       PLAN:     1. Start spironolactone (Aldactone) 50 mg once a day   2. Am cortisol level pending   3. DST pending those results   4. Follow up in 6 months       Orders Placed This Encounter   Procedures    Cortisol Total     Standing Status:   Future     Standing Expiration Date:   2/11/2023     Order Specific Question:   8AM or 4PM?     Answer:   8:00 AM    Comprehensive Metabolic Panel     Standing Status:   Future     Standing Expiration Date:   2/11/2023    Hemoglobin A1C     Standing Status:   Future     Standing Expiration Date:   2/11/2023    ACTH     Standing Status:   Future     Standing Expiration Date:   2/11/2023    Insulin Free & Total     Standing Status:   Future     Standing Expiration Date:   2/11/2023     No orders of the defined types were placed in this encounter. Return in about 6 months (around 8/11/2022). Subjective:     Chief Complaint   Patient presents with    6 Month Follow-Up     Vitals:    02/11/22 1408   BP: 100/62   Pulse: 71   SpO2: 99%   Weight: 256 lb (116.1 kg)   Height: 5' 7.5\" (1.715 m)     Wt Readings from Last 3 Encounters:   02/11/22 256 lb (116.1 kg)   10/26/21 250 lb (113.4 kg)   09/15/21 259 lb 3.2 oz (117.6 kg)     BP Readings from Last 3 Encounters:   02/11/22 100/62   10/26/21 124/66   09/15/21 120/72     Patient is a 44-year-old -American female who presents for evaluation of her PCOS today. She was diagnosed approximately 20 years ago, has never taken any medication for that. She has had history of dysmenorrhea with 2 to 3 months between very heavy menstrual cycles. This has improved on her current OCP NuvaRing.   Thorough review of laboratories and previous records show no hyperglycemia, hyper or hypocalcemia A1c is normal.  Vital signs are normal.  She denies any hair skin or nail changes. She has however been experiencing intermittent night sweats 4-5 times a week that seem to be getting worse in the last year. Talked about the pathophysiology of PCOS the risk of diabetes due to insulin resistance and hyperlipidemia and hypercalcemia. We discussed the need for lifestyle changes including strict exercise regimen dietary restrictions. She verbalized understanding. Going to order nutritional services consultation. We discussed utilizing a GLP-1 or Adipex in the future for weight loss when she has a fitness program in place. Past Medical History:   Diagnosis Date    PCOS (polycystic ovarian syndrome)     Thyroid disease      Past Surgical History:   Procedure Laterality Date    LAPAROSCOPY       Social History     Socioeconomic History    Marital status: Single     Spouse name: Not on file    Number of children: Not on file    Years of education: Not on file    Highest education level: Not on file   Occupational History    Not on file   Tobacco Use    Smoking status: Never Smoker    Smokeless tobacco: Never Used   Substance and Sexual Activity    Alcohol use: Yes     Comment: socially    Drug use: Never    Sexual activity: Not on file   Other Topics Concern    Not on file   Social History Narrative    Not on file     Social Determinants of Health     Financial Resource Strain:     Difficulty of Paying Living Expenses: Not on file   Food Insecurity:     Worried About Running Out of Food in the Last Year: Not on file    Too of Food in the Last Year: Not on file   Transportation Needs:     Lack of Transportation (Medical): Not on file    Lack of Transportation (Non-Medical):  Not on file   Physical Activity:     Days of Exercise per Week: Not on file    Minutes of Exercise per Session: Not on file   Stress:     Feeling of Stress : Not on file   Social Connections:     Frequency of Communication with Friends and Family: Not on file    Frequency of Social Gatherings with Friends and Family: Not on file    Attends [employer] Services: Not on file    Active Member of Clubs or Organizations: Not on file    Attends Club or Organization Meetings: Not on file    Marital Status: Not on file   Intimate Partner Violence:     Fear of Current or Ex-Partner: Not on file    Emotionally Abused: Not on file    Physically Abused: Not on file    Sexually Abused: Not on file   Housing Stability:     Unable to Pay for Housing in the Last Year: Not on file    Number of [place] in the Last Year: Not on file    Unstable Housing in the Last Year: Not on file     Family History   Problem Relation Age of Onset    Cancer Mother         multiple myeloma    No Known Problems Father     Stroke Maternal Aunt      Allergies   Allergen Reactions    Codeine        Current Outpatient Medications:     sertraline (ZOLOFT) 50 MG tablet, TAKE 1 TABLET BY MOUTH EVERY DAY, Disp: 30 tablet, Rfl: 1    norethindrone-ethinyl estradiol-Fe (LO LOESTRIN FE) 1 MG-10 MCG / 10 MCG tablet, Take 1 tablet by mouth daily, Disp: 28 tablet, Rfl: 0    albuterol sulfate  (90 Base) MCG/ACT inhaler, Inhale 2 puffs into the lungs every 6 hours as needed for Wheezing, Disp: 1 each, Rfl: 2    metoprolol tartrate (LOPRESSOR) 25 MG tablet, TAKE 1 TABLET BY MOUTH EVERY DAY, Disp: 90 tablet, Rfl: 1    spironolactone (ALDACTONE) 50 MG tablet, Take 1 tablet by mouth 2 times daily, Disp: 60 tablet, Rfl: 5  Lab Results   Component Value Date     02/10/2022    K 4.5 02/10/2022     02/10/2022    CO2 24 02/10/2022    BUN 12 02/10/2022    CREATININE 0.86 02/10/2022    GLUCOSE 93 02/10/2022    CALCIUM 9.4 02/10/2022    PROT 7.3 02/10/2022    LABALBU 4.1 02/10/2022    BILITOT <0.2 02/10/2022    ALKPHOS 48 02/10/2022    AST 19 02/10/2022    ALT 16 02/10/2022    LABGLOM >60.0 02/10/2022    GFRAA >60.0 02/10/2022    GLOB 3.2 02/10/2022     Lab Results   Component Value Date    WBC 5.7 06/26/2021    HGB 13.1 06/26/2021    HCT 39.6 06/26/2021    MCV 83.4 06/26/2021     06/26/2021     Lab Results   Component Value Date    LABA1C 5.4 06/26/2021     Results for Jayshree Bills (MRN [id number]) as of 2/11/2022 14:28   Ref. Range 6/26/2021 10:36   Cortisol - AM Latest Ref Range: 6.2 - 19.4 ug/dL 21.7 (H)   Results for Jayshree Bills (MRN [id number]) as of 2/11/2022 14:28   Ref. Range 6/26/2021 10:36   DHEA (Dehydroepiandrosterone) Latest Ref Range: 1.330 - 7.780 ng/mL 6. 113       Results for Cheryle Paul (MRN [id number]) as of 7/16/2021 12:28   Ref. Range 6/26/2021 10:36   Sex Hormone Binding Latest Ref Range: 30 - 135 nmol/L 171 (H)   Testosterone, Free Latest Ref Range: 0.8 - 7.4 pg/mL 3.3   Testosterone, Females/Children Latest Ref Range: 9 - 55 ng/dL 66 (H)   Results for Cheryle Paul (MRN [id number]) as of 7/16/2021 12:28   Ref. Range 6/26/2021 10:36   Insulin Latest Ref Range: 2.6 - 24.9 uIU/mL 11.2   Results for Cheryle Paul (MRN [id number]) as of 7/16/2021 12:28   Ref. Range 6/26/2021 10:36   Prolactin Latest Units: ng/mL 20.0     Lab Results   Component Value Date    HDL 66 (H) 06/26/2021    LDLCALC 98 06/26/2021    CHOL 183 06/26/2021    TRIG 95 06/26/2021     No results found for: TESTM  Lab Results   Component Value Date    TSH 1.080 06/26/2021    T4FREE 1.36 06/26/2021     Lab Results   Component Value Date    TPOABS <4.0 06/26/2021       Review of Systems   Constitutional: Negative for chills, fatigue and fever. HENT: Negative for congestion, ear pain, postnasal drip, rhinorrhea, sinus pressure and sore throat. Eyes: Negative for pain and redness. Respiratory: Negative for cough, shortness of breath and wheezing. Cardiovascular: Negative for chest pain, palpitations and leg swelling. Gastrointestinal: Negative for abdominal pain, diarrhea, nausea and vomiting. Endocrine: Negative for cold intolerance and heat intolerance.         No thyromegaly or palpable nodules, mild cervical fat pad with acanthosis around the cervical area   Genitourinary: Negative for difficulty urinating. Musculoskeletal: Negative for arthralgias. Skin: Negative for rash. Neurological: Negative for dizziness and headaches. Hematological: Negative for adenopathy. Psychiatric/Behavioral: Negative for dysphoric mood. Objective:   Physical Exam  Vitals reviewed. Constitutional:       Appearance: She is well-developed. HENT:      Head: Normocephalic and atraumatic. Nose: No congestion. Mouth/Throat:      Mouth: Mucous membranes are moist.   Eyes:      Conjunctiva/sclera: Conjunctivae normal.   Cardiovascular:      Rate and Rhythm: Normal rate and regular rhythm. Heart sounds: Normal heart sounds. Pulmonary:      Effort: Pulmonary effort is normal.      Breath sounds: Normal breath sounds. Abdominal:      General: Bowel sounds are normal.      Palpations: Abdomen is soft. Musculoskeletal:         General: Normal range of motion. Cervical back: Normal range of motion and neck supple. Skin:     General: Skin is warm and dry. Neurological:      Mental Status: She is alert and oriented to person, place, and time.    Psychiatric:         Mood and Affect: Mood normal.

## 2022-02-20 DIAGNOSIS — F32.81 PMDD (PREMENSTRUAL DYSPHORIC DISORDER): ICD-10-CM

## 2022-02-20 DIAGNOSIS — E28.2 PCOS (POLYCYSTIC OVARIAN SYNDROME): ICD-10-CM

## 2022-02-23 RX ORDER — NORETHINDRONE ACETATE AND ETHINYL ESTRADIOL, ETHINYL ESTRADIOL AND FERROUS FUMARATE 1MG-10(24)
KIT ORAL
Qty: 28 TABLET | Refills: 0 | Status: SHIPPED | OUTPATIENT
Start: 2022-02-23 | End: 2022-06-10

## 2022-02-23 NOTE — TELEPHONE ENCOUNTER
Pharmacy requesting medication refill.  Please approve or deny this request.    Rx requested:  Requested Prescriptions     Pending Prescriptions Disp Refills    LO LOESTRIN FE 1 MG-10 MCG / 10 MCG tablet [Pharmacy Med Name: James Don FE 1-10 TABLET] 28 tablet 0     Sig: TAKE 1 TABLET BY MOUTH EVERY DAY         Last Office Visit:   11/11/2021      Next Visit Date:  Future Appointments   Date Time Provider Jony Stone   8/12/2022  2:00 PM Roselyn Luz, [address] [facility]

## 2022-02-24 DIAGNOSIS — F33.1 MODERATE EPISODE OF RECURRENT MAJOR DEPRESSIVE DISORDER (HCC): ICD-10-CM

## 2022-02-24 NOTE — TELEPHONE ENCOUNTER
Pharmacy is requesting medication refill.  Please approve or deny this request.    Rx requested:  Requested Prescriptions     Pending Prescriptions Disp Refills    sertraline (ZOLOFT) 50 MG tablet 90 tablet 0     Sig: Take 1 tablet by mouth daily         Last Office Visit:   11/11/2021      Next Visit Date:  Future Appointments   Date Time Provider Jony Stone   8/12/2022  2:00 PM Kallie Brush, [address]

## 2022-03-17 DIAGNOSIS — E28.2 PCOS (POLYCYSTIC OVARIAN SYNDROME): ICD-10-CM

## 2022-03-17 DIAGNOSIS — F32.81 PMDD (PREMENSTRUAL DYSPHORIC DISORDER): ICD-10-CM

## 2022-03-17 RX ORDER — NORETHINDRONE ACETATE AND ETHINYL ESTRADIOL, ETHINYL ESTRADIOL AND FERROUS FUMARATE 1MG-10(24)
KIT ORAL
Qty: 28 TABLET | Refills: 0 | OUTPATIENT
Start: 2022-03-17

## 2022-03-17 NOTE — TELEPHONE ENCOUNTER
pharmacy requesting medication refill.  Please approve or deny this request.    Rx requested:  Requested Prescriptions     Pending Prescriptions Disp Refills    LO LOESTRIN FE 1 MG-10 MCG / 10 MCG tablet [Pharmacy Med Name: Suhail Life FE 1-10 TABLET] 28 tablet 0     Sig: TAKE 1 TABLET BY MOUTH EVERY DAY         Last Office Visit:   11/11/2021      Next Visit Date:  Future Appointments   Date Time Provider Jony Stone   8/12/2022  2:00 PM Dennise Franz, [address]

## 2022-03-18 NOTE — TELEPHONE ENCOUNTER
1st attempt to reach patient. Called patient @ [phone]  and left message on machine for patient to return call during normal business hours of 8:30 AM and 5 PM @ [phone] option 2.

## 2022-03-18 NOTE — TELEPHONE ENCOUNTER
Patient is overdue for follow-up visit with PCP Cherry Santiago). Please help her schedule depression follow-up visit in the next month.   Once visit is scheduled send refill request back to PCP

## 2022-04-07 RX ORDER — SPIRONOLACTONE 50 MG/1
TABLET, FILM COATED ORAL
Qty: 180 TABLET | Refills: 1 | Status: SHIPPED | OUTPATIENT
Start: 2022-04-07

## 2022-05-31 ENCOUNTER — OFFICE VISIT (OUTPATIENT)
Dept: FAMILY MEDICINE CLINIC | Age: 32
End: 2022-05-31
Payer: COMMERCIAL

## 2022-05-31 ENCOUNTER — HOSPITAL ENCOUNTER (OUTPATIENT)
Age: 32
Setting detail: SPECIMEN
Discharge: HOME | End: 2022-05-31
Payer: COMMERCIAL

## 2022-05-31 VITALS
HEART RATE: 81 BPM | RESPIRATION RATE: 16 BRPM | TEMPERATURE: 97.4 F | HEIGHT: 68 IN | BODY MASS INDEX: 38.34 KG/M2 | SYSTOLIC BLOOD PRESSURE: 118 MMHG | OXYGEN SATURATION: 98 % | DIASTOLIC BLOOD PRESSURE: 60 MMHG | WEIGHT: 253 LBS

## 2022-05-31 DIAGNOSIS — R35.0 URINARY FREQUENCY: ICD-10-CM

## 2022-05-31 DIAGNOSIS — R35.0 URINARY FREQUENCY: Primary | ICD-10-CM

## 2022-05-31 DIAGNOSIS — R10.2 PELVIC PAIN: ICD-10-CM

## 2022-05-31 LAB
BILIRUBIN URINE: NEGATIVE
BILIRUBIN, POC: NORMAL
BLOOD URINE, POC: NORMAL
BLOOD, URINE: NEGATIVE
CLARITY, POC: CLEAR
CLARITY: CLEAR
COLOR, POC: YELLOW
COLOR: YELLOW
CONTROL: NORMAL
GLUCOSE URINE, POC: NORMAL
GLUCOSE URINE: NEGATIVE MG/DL
KETONES, POC: NORMAL
KETONES, URINE: NEGATIVE MG/DL
LEUKOCYTE EST, POC: NORMAL
LEUKOCYTE ESTERASE, URINE: NEGATIVE
NITRITE, POC: NORMAL
NITRITE, URINE: NEGATIVE
PH UA: 5 (ref 5–9)
PH, POC: 6
PREGNANCY TEST URINE, POC: NORMAL
PROTEIN UA: NEGATIVE MG/DL
PROTEIN, POC: NORMAL
SPECIFIC GRAVITY UA: 1.01 (ref 1–1.03)
SPECIFIC GRAVITY, POC: 1.03
UROBILINOGEN, POC: NORMAL
UROBILINOGEN, URINE: 0.2 E.U./DL

## 2022-05-31 PROCEDURE — 87086 URINE CULTURE/COLONY COUNT: CPT

## 2022-05-31 PROCEDURE — 81003 URINALYSIS AUTO W/O SCOPE: CPT

## 2022-05-31 PROCEDURE — 99214 OFFICE O/P EST MOD 30 MIN: CPT

## 2022-05-31 PROCEDURE — 81025 URINE PREGNANCY TEST: CPT

## 2022-05-31 RX ORDER — PHENAZOPYRIDINE HYDROCHLORIDE 200 MG/1
200 TABLET, FILM COATED ORAL 3 TIMES DAILY PRN
Qty: 9 TABLET | Refills: 0 | Status: SHIPPED | OUTPATIENT
Start: 2022-05-31 | End: 2022-06-03

## 2022-05-31 SDOH — ECONOMIC STABILITY: FOOD INSECURITY: WITHIN THE PAST 12 MONTHS, YOU WORRIED THAT YOUR FOOD WOULD RUN OUT BEFORE YOU GOT MONEY TO BUY MORE.: NEVER TRUE

## 2022-05-31 SDOH — ECONOMIC STABILITY: FOOD INSECURITY: WITHIN THE PAST 12 MONTHS, THE FOOD YOU BOUGHT JUST DIDN'T LAST AND YOU DIDN'T HAVE MONEY TO GET MORE.: NEVER TRUE

## 2022-05-31 ASSESSMENT — ENCOUNTER SYMPTOMS
ABDOMINAL DISTENTION: 0
ABDOMINAL PAIN: 0
RESPIRATORY NEGATIVE: 1
BACK PAIN: 0
NAUSEA: 0

## 2022-05-31 ASSESSMENT — PATIENT HEALTH QUESTIONNAIRE - PHQ9
10. IF YOU CHECKED OFF ANY PROBLEMS, HOW DIFFICULT HAVE THESE PROBLEMS MADE IT FOR YOU TO DO YOUR WORK, TAKE CARE OF THINGS AT HOME, OR GET ALONG WITH OTHER PEOPLE: 0
3. TROUBLE FALLING OR STAYING ASLEEP: 0
SUM OF ALL RESPONSES TO PHQ QUESTIONS 1-9: 0
6. FEELING BAD ABOUT YOURSELF - OR THAT YOU ARE A FAILURE OR HAVE LET YOURSELF OR YOUR FAMILY DOWN: 0
9. THOUGHTS THAT YOU WOULD BE BETTER OFF DEAD, OR OF HURTING YOURSELF: 0
8. MOVING OR SPEAKING SO SLOWLY THAT OTHER PEOPLE COULD HAVE NOTICED. OR THE OPPOSITE, BEING SO FIGETY OR RESTLESS THAT YOU HAVE BEEN MOVING AROUND A LOT MORE THAN USUAL: 0
1. LITTLE INTEREST OR PLEASURE IN DOING THINGS: 0
5. POOR APPETITE OR OVEREATING: 0
7. TROUBLE CONCENTRATING ON THINGS, SUCH AS READING THE NEWSPAPER OR WATCHING TELEVISION: 0
SUM OF ALL RESPONSES TO PHQ QUESTIONS 1-9: 0
2. FEELING DOWN, DEPRESSED OR HOPELESS: 0
SUM OF ALL RESPONSES TO PHQ9 QUESTIONS 1 & 2: 0
4. FEELING TIRED OR HAVING LITTLE ENERGY: 0

## 2022-05-31 ASSESSMENT — SOCIAL DETERMINANTS OF HEALTH (SDOH): HOW HARD IS IT FOR YOU TO PAY FOR THE VERY BASICS LIKE FOOD, HOUSING, MEDICAL CARE, AND HEATING?: NOT HARD AT ALL

## 2022-05-31 NOTE — PATIENT INSTRUCTIONS
You may take Pyridium today to see if this helps improve your symptoms. I have sent your urine sample for a laboratory urinalysis and urine culture I will call you when the results of either are available.

## 2022-05-31 NOTE — PROGRESS NOTES
[address] Encounter        ASSESSMENT/PLAN     Anna Szymanski is a 33 y.o. female who presents with:  Reports pain when she goes to sit down or stand up in the pelvic region. Also reporting urinary frequency and having to urinate or feel the urge to urinate shortly after urination. She denies vaginal pain discharge or odor. Pt is afebrile has nontoxic appearance and VS are stable. She reports history of ovarian cysts and surgery to remove cysts. On examination patient denies pain with percussion over the bilateral flanks. She reports sharp pains with palpation of the right upper quadrant and the left lower quadrant. POC urinalysis is negative. Patient is sexually active not currently taking any contraceptives. POC pregnancy is negative. POC urinalysis is negative. 1. Urinary frequency    2. Pelvic pain      Laboratory perform urinalysis and urine culture sent for further evaluation. Patient placed on Pyridium to treat symptoms. We will call her within 24 hours when laboratory performed urinalysis is complete to evaluate if Pyridium has been effective and to discuss results. PATIENT REFERRED TO:  Return if symptoms worsen or fail to improve. DISCHARGE MEDICATIONS:  New Prescriptions    PHENAZOPYRIDINE (PYRIDIUM) 200 MG TABLET    Take 1 tablet by mouth 3 times daily as needed for Pain     Cannot display discharge medications since this is not an admission. Rachael Shea, APRN - CNP    CHIEF COMPLAINT       Chief Complaint   Patient presents with    Pain     Pain present when pt has urge to urinate and after urination. Pain is located right in pelvis and radiates down both legs and to right side of back. SUBJECTIVE/REVIEW OF SYSTEMS     Review of Systems   Constitutional: Negative for chills, fatigue and fever. Respiratory: Negative. Cardiovascular: Negative.     Gastrointestinal: Negative for abdominal distention, abdominal pain and nausea. Endocrine: Negative. Genitourinary: Positive for frequency and pelvic pain. Negative for decreased urine volume, difficulty urinating, dysuria, flank pain, hematuria, urgency, vaginal bleeding, vaginal discharge and vaginal pain. Musculoskeletal: Negative for back pain and myalgias. Skin: Negative. Negative for rash. Neurological: Negative for dizziness and light-headedness. Psychiatric/Behavioral: Negative. OBJECTIVE/PHYSICAL EXAM     Physical Exam  Constitutional:       General: She is not in acute distress. Appearance: Normal appearance. She is not ill-appearing, toxic-appearing or diaphoretic. HENT:      Head: Normocephalic. Cardiovascular:      Rate and Rhythm: Normal rate and regular rhythm. Pulmonary:      Effort: Pulmonary effort is normal. No respiratory distress. Abdominal:      General: Abdomen is flat. Bowel sounds are normal. There is no distension. Palpations: Abdomen is soft. There is no mass. Tenderness: There is abdominal tenderness. There is no right CVA tenderness, left CVA tenderness, guarding or rebound. Hernia: No hernia is present. Musculoskeletal:         General: No tenderness. Normal range of motion. Skin:     General: Skin is warm and dry. Capillary Refill: Capillary refill takes less than 2 seconds. Coloration: Skin is not jaundiced or pale. Findings: No bruising or rash. Neurological:      Mental Status: She is alert and oriented to person, place, and time. Psychiatric:         Mood and Affect: Mood normal.         Behavior: Behavior normal.         VITALS  BP: 118/60, Temp: 97.4 °F (36.3 °C), Temp Source: Temporal, Heart Rate: 81, Resp: 16, SpO2: 98 %      PAST MEDICAL HISTORY         Diagnosis Date    PCOS (polycystic ovarian syndrome)     Thyroid disease      SURGICAL HISTORY     Patient  has a past surgical history that includes laparoscopy.   CURRENT MEDICATIONS       Previous Medications ALBUTEROL SULFATE  (90 BASE) MCG/ACT INHALER    Inhale 2 puffs into the lungs every 6 hours as needed for Wheezing    LO LOESTRIN FE 1 MG-10 MCG / 10 MCG TABLET    TAKE 1 TABLET BY MOUTH EVERY DAY    METOPROLOL TARTRATE (LOPRESSOR) 25 MG TABLET    TAKE 1 TABLET BY MOUTH EVERY DAY    SERTRALINE (ZOLOFT) 50 MG TABLET    Take 1 tablet by mouth daily    SPIRONOLACTONE (ALDACTONE) 50 MG TABLET    TAKE 1 TABLET BY MOUTH TWICE A DAY     ALLERGIES     Patient is is allergic to codeine. FAMILY HISTORY     Patient'sfamily history includes Cancer in her mother; No Known Problems in her father; Stroke in her maternal aunt. HISTORY     Patient  reports that she has never smoked. She has never used smokeless tobacco. She reports current alcohol use. She reports that she does not use drugs. READY CARE COURSE     Orders Placed This Encounter   Procedures    Culture, Urine     Standing Status:   Future     Standing Expiration Date:   5/31/2023     Order Specific Question:   Specify (ex-cath, midstream, cysto, etc)? Answer:   midstream    Urinalysis     Standing Status:   Future     Standing Expiration Date:   5/31/2023    POCT Urinalysis No Micro (Auto)    POCT urine pregnancy        Labs:  Results for POC orders placed in visit on 05/31/22   POCT Urinalysis No Micro (Auto)   Result Value Ref Range    Color, UA yellow     Clarity, UA clear     Glucose, UA POC -     Bilirubin, UA -     Ketones, UA -     Spec Grav, UA 1.030     Blood, UA POC -     pH, UA 6.0     Protein, UA POC -     Urobilinogen, UA -     Leukocytes, UA -     Nitrite, UA -    POCT urine pregnancy   Result Value Ref Range    Preg Test, Ur -     Control       IMAGING:  No orders to display     Scheduled Meds:  Continuous Infusions:  PRN Meds:.

## 2022-06-02 DIAGNOSIS — R10.2 PELVIC PAIN: Primary | ICD-10-CM

## 2022-06-02 LAB — URINE CULTURE, ROUTINE: NORMAL

## 2022-06-09 SDOH — HEALTH STABILITY: PHYSICAL HEALTH: ON AVERAGE, HOW MANY MINUTES DO YOU ENGAGE IN EXERCISE AT THIS LEVEL?: 50 MIN

## 2022-06-09 SDOH — HEALTH STABILITY: PHYSICAL HEALTH: ON AVERAGE, HOW MANY DAYS PER WEEK DO YOU ENGAGE IN MODERATE TO STRENUOUS EXERCISE (LIKE A BRISK WALK)?: 5 DAYS

## 2022-06-10 ENCOUNTER — OFFICE VISIT (OUTPATIENT)
Dept: FAMILY MEDICINE CLINIC | Age: 32
End: 2022-06-10
Payer: COMMERCIAL

## 2022-06-10 VITALS
TEMPERATURE: 97.8 F | HEART RATE: 81 BPM | BODY MASS INDEX: 38.13 KG/M2 | OXYGEN SATURATION: 98 % | HEIGHT: 68 IN | WEIGHT: 251.6 LBS | SYSTOLIC BLOOD PRESSURE: 128 MMHG | DIASTOLIC BLOOD PRESSURE: 62 MMHG

## 2022-06-10 DIAGNOSIS — M62.838 MUSCLE SPASM: ICD-10-CM

## 2022-06-10 DIAGNOSIS — Z30.9 ENCOUNTER FOR CONTRACEPTIVE MANAGEMENT, UNSPECIFIED TYPE: Primary | ICD-10-CM

## 2022-06-10 DIAGNOSIS — F33.1 MODERATE EPISODE OF RECURRENT MAJOR DEPRESSIVE DISORDER (HCC): ICD-10-CM

## 2022-06-10 PROCEDURE — 99214 OFFICE O/P EST MOD 30 MIN: CPT | Performed by: FAMILY MEDICINE

## 2022-06-10 RX ORDER — CYCLOBENZAPRINE HCL 5 MG
5 TABLET ORAL 3 TIMES DAILY PRN
Qty: 30 TABLET | Refills: 0 | Status: SHIPPED | OUTPATIENT
Start: 2022-06-10 | End: 2022-06-20

## 2022-06-10 NOTE — PROGRESS NOTES
Chief Complaint   Patient presents with    New Patient   [address] [facility] [place] [address] [place]     Discuss birth control options        HPI: Rosa Elena Bojorquez 33 y.o. female presenting for     Patient is on pill and nuvaring   Reports that the nuvaring caused indegestion and was not complaint with the pills   Denies nay history of migraines   Denies any fever, chills, nausea, vomiting chest pain, shortenss of breath, abomdinal pain     PCOS   On the spironolactone   Will help with the hair   Dermatitis   Couple of months ago   As given clobetasol   Was on the back of neck   Resolved. Shingles   Was diagnosed 2 years ago   Was treated   No issues. Depression and anxiety   zoloft 50 mg daily   Stable     Current Outpatient Medications   Medication Sig Dispense Refill    Segesterone-Ethinyl Estradiol 0.15-0.013 MG/24HR RING Insert 1 ring vaginally. Following insertion, ring should remain in place for 21 continuous days (3 weeks), then removed for 7 days (1 week). This pattern (3 weeks in and 1 week out) is one cycle; one ring provides contraception for 13 cycles (1 year). 3 each 1    sertraline (ZOLOFT) 50 MG tablet Take 1 tablet by mouth daily 90 tablet 1    cyclobenzaprine (FLEXERIL) 5 MG tablet Take 1 tablet by mouth 3 times daily as needed for Muscle spasms 30 tablet 0    spironolactone (ALDACTONE) 50 MG tablet TAKE 1 TABLET BY MOUTH TWICE A  tablet 1     No current facility-administered medications for this visit. ROS  CONSTITUTIONAL: The patient denies fevers, chills, sweats and body ache. HEENT: Denies headache, blurry vision, eye pain, tinnitus, vertigo,  sore throat, neck or thyroid masses. RESPIRATORY: Denies cough, sputum, hemoptysis. CARDIAC: Denies chest pain, pressure, palpitations, Denies lower extremity edema.   GASTROINTESTINAL: Denies abdominal pain, constipation, diarrhea, bleeding in the stools,   GENITOURINARY: Denies dysuria, hematuria, nocturia or frequency, urinary incontinence. NEUROLOGIC: Denies headaches, dizziness, syncope, weakness  MUSCULOSKELETAL: denies changes in range of motion, joint pain, stiffness. ENDOCRINOLOGY: Denies heat or cold intolerance. HEMATOLOGY: Denies easy bleeding or blood transfusion,anemia  DERMATOLOGY: Denies changes in moles or pigmentation changes. PSYCHIATRY: admits depression and anxiety     Past Medical History:   Diagnosis Date    PCOS (polycystic ovarian syndrome)     Thyroid disease         Past Surgical History:   Procedure Laterality Date    LAPAROSCOPY          Family History   Problem Relation Age of Onset   Floydene Ada Cancer Mother         multiple myeloma    No Known Problems Father     Stroke Maternal Aunt         Social History     Socioeconomic History    Marital status: Single     Spouse name: Not on file    Number of children: Not on file    Years of education: Not on file    Highest education level: Not on file   Occupational History    Not on file   Tobacco Use    Smoking status: Never Smoker    Smokeless tobacco: Never Used   Substance and Sexual Activity    Alcohol use: Yes     Comment: socially    Drug use: Never    Sexual activity: Not on file   Other Topics Concern    Not on file   Social History Narrative    Not on file     Social Determinants of Health     Financial Resource Strain: Low Risk     Difficulty of Paying Living Expenses: Not hard at all   Food Insecurity: No Food Insecurity    Worried About Running Out of Food in the Last Year: Never true    Too of Food in the Last Year: Never true   Transportation Needs:     Lack of Transportation (Medical): Not on file    Lack of Transportation (Non-Medical):  Not on file   Physical Activity: Sufficiently Active    Days of Exercise per Week: 5 days    Minutes of Exercise per Session: 50 min   Stress:     Feeling of Stress : Not on file   Social Connections:     Frequency of Communication with Friends and Family: Not on file    Frequency of Social Gatherings with Friends and Family: Not on file    Attends Church Services: Not on file    Active Member of Clubs or Organizations: Not on file    Attends Club or Organization Meetings: Not on file    Marital Status: Not on file   Intimate Partner Violence: Not At Risk    Fear of Current or Ex-Partner: No    Emotionally Abused: No    Physically Abused: No    Sexually Abused: No   Housing Stability:     Unable to Pay for Housing in the Last Year: Not on file    Number of [place] in the Last Year: Not on file    Unstable Housing in the Last Year: Not on file        /62   Pulse 81   Temp 97.8 °F (36.6 °C) (Temporal)   Ht 5' 8\" (1.727 m)   Wt 251 lb 9.6 oz (114.1 kg)   LMP 05/06/2022   SpO2 98%   BMI 38.26 kg/m²        Physical Exam:    General appearance - alert, well appearing, and in no distress  Mental Status - alert, oriented to person, place, and time  Eyes - pupils equal and reactive, extraocular eye movements intact   Ears - bilateral TM's and external ear canals normal   Nose - normal and patent, no erythema, discharge or polyps   Sinuses - Normal paranasal sinuses without tenderness   Throat - mucous membranes moist, pharynx normal without lesions   Neck - supple, no significant adenopathy   Thyroid - thyroid is normal in size without nodules or tenderness    Chest - clear to auscultation, no wheezes, rales or rhonchi, symmetric air entry   Heart - normal rate, regular rhythm, normal S1, S2, no murmurs, rubs, clicks or gallops  Abdomen - soft, nontender, nondistended, no masses or organomegaly   Back exam - full range of motion, no tenderness, palpable spasm or pain on motion   Neurological - alert, oriented, normal speech, no focal findings or movement disorder noted   Musculoskeletal - no joint tenderness, deformity or swelling.  Admits to intermittent rectal spasms  Extremities - peripheral pulses normal, no pedal edema, no clubbing or cyanosis   Skin - normal coloration and turgor, no rashes, no suspicious skin lesions noted    Labs   No results found for: TSHREFLEX  TSH   Date Value Ref Range Status   06/26/2021 1.080 0.440 - 3.860 uIU/mL Final     Lab Results   Component Value Date     02/10/2022    K 4.5 02/10/2022     02/10/2022    CO2 24 02/10/2022    BUN 12 02/10/2022    CREATININE 0.86 02/10/2022    GLUCOSE 93 02/10/2022    CALCIUM 9.4 02/10/2022    PROT 7.3 02/10/2022    LABALBU 4.1 02/10/2022    BILITOT <0.2 02/10/2022    ALKPHOS 48 02/10/2022    AST 19 02/10/2022    ALT 16 02/10/2022    LABGLOM >60.0 02/10/2022    GFRAA >60.0 02/10/2022    GLOB 3.2 02/10/2022       Lab Results   Component Value Date    LABA1C 5.4 06/26/2021     No results found for: EAG    Lab Results   Component Value Date    WBC 5.7 06/26/2021    HGB 13.1 06/26/2021    HCT 39.6 06/26/2021    MCV 83.4 06/26/2021     06/26/2021           A/P: Alexandria Deleon 33 y.o. female presenting for    1. Moderate episode of recurrent major depressive disorder (HCC)    - sertraline (ZOLOFT) 50 MG tablet; Take 1 tablet by mouth daily  Dispense: 90 tablet; Refill: 1    2. Encounter for contraceptive management, unspecified type    - Segesterone-Ethinyl Estradiol 0.15-0.013 MG/24HR RING; Insert 1 ring vaginally. Following insertion, ring should remain in place for 21 continuous days (3 weeks), then removed for 7 days (1 week). This pattern (3 weeks in and 1 week out) is one cycle; one ring provides contraception for 13 cycles (1 year). Dispense: 3 each; Refill: 1    3. Muscle spasm    - cyclobenzaprine (FLEXERIL) 5 MG tablet; Take 1 tablet by mouth 3 times daily as needed for Muscle spasms  Dispense: 30 tablet; Refill: 0          Please note, this report has been partially produced using speech recognition software  and may cause  and /or contain errors related to that system including grammar, punctuation and spelling as well as words and phrases that may seem inappropriate.  If there are questions or concerns please feel free to contact me to clarify.

## 2022-06-10 NOTE — PATIENT INSTRUCTIONS
Patient Education        ethinyl estradiol and segesterone (vaginal ring)  Pronunciation: Belle Dominguez in il es tra DYE ole and se TERAN ter one  Brand: Jimy  What is the most important information I should know about this medicine? You should not use this medicine if you have: uncontrolled high blood pressure, heart problems, coronary artery disease, reduced blood flow to your brain, long-term diabetes (or health problems caused by diabetes), undiagnosed vaginal bleeding, liver disease or liver cancer, severe headaches, if you also take certain hepatitis C medication, if you will have major surgery, if you smoke and are over 28, or if you have ever had a heart attack, a stroke, a blood clot, or cancer of the breast, uterus/cervix,or vagina. You should not use this medicine if you smoke and are older than 28years of age. Do not use the vaginal ring if you are pregnant or if you have recently had a baby. What is ethinyl estradiol and segesterone? Ethinyl estradiol and segesterone vaginal system (ring) is used as contraception to prevent pregnancy. One vaginal ring may be reused for up to 1year. Ethinyl estradiol and segesterone may also be used for purposes not listed inthis medication guide. What should I discuss with my healthcare provider before using this medicine?   You should not use this medicine if you are allergic to ethinyl estradiol orsegesterone, or if you have:   uncontrolled high blood pressure (especially if you also have blood vessel damage);   heart problems (coronary artery disease, heart rhythm disorder, a heart valve disorder, history of heart attack, stroke, or blood clot);   reduced blood flow to your brain;   an increased risk of having blood clots due to a heart problem or a hereditary blood disorder;   diabetes (and you are over 28years old, or have had diabetes longer than 20 years);   a diabetes-related health problem (vision problems, kidney problems, nerve damage, circulation problems);   a history of hormone-related cancer, or cancer of the breast, uterus/cervix, or vagina;   unusual vaginal bleeding that has not been checked by a doctor;   migraines or severe headaches with vision changes;   liver disease or liver cancer; or   if you have taken any hepatitis C medication containing ombitasvir/paritaprevir/ritonavir in the past 2 weeks. You should not use this medicine if you smoke and are older than 28years of age. Using this medicine can increase your risk of blood clots, stroke, or heartattack, especially if you smoke. Tell your doctor if you have ever had:   heart disease, high blood pressure, or if you are prone to having blood clots;   high cholesterol or triglycerides (a type of fat in the blood);   liver disease or jaundice (yellowing of your skin or eyes);   kidney disease;   diabetes, gallbladder disease;   depression, migraine headaches, or a seizure;   toxic shock syndrome, or easy vaginal irritation;   a family history of stroke or breast cancer;   a breast lump, fibrocystic breast disease, or an abnormal mammogram; or   if you have recently had a miscarriage or . Do not use a vaginal ring if you are pregnant, or if you had a baby within the past 4 weeks. Call your doctor if you miss a period or think you may be pregnant. You should not breastfeed while using this medicine. Ethinyl estradiol and segesterone is not approved for any woman who has not yethad a menstrual period, or a woman no longer having periods. How should I use ethinyl estradiol and segesterone? Follow all directions on your prescription label and read all medication guides or instruction sheets. Use the medicine exactly as directed. Read and carefully follow any Instructions for Use provided with your medicine. The vaginal ring will not prevent pregnancy if you wear it only during intercourse. You must wear the ring around-the-clock for 3 full weeks (21 days).   After 21 days, remove the ring and wait 7 full days before inserting the ring again. Try to stay on your schedule of inserting and removing the ring. Do not leave the ring out of the vagina for longer than a total of 2 hours during the 21-day wearing time. If a ring falls out, wash it with mild soap and warm water, pat dry with aclean cloth, and reinsert it. You may need to use back-up birth control (condoms or spermicide, but not afemale condom) for 7 days in a row:   if the ring has been out of the vagina for more than a total of 2 hours during the 21-day wearing time;   if the ring has been out of the vagina for longer than 7 days; or   if you recently had a baby and have not yet started having periods again. You may have breakthrough bleeding. Tell your doctor if this bleeding continues for longer than 7 days or is veryheavy. If you need major surgery or will be on long-term bed rest, you may need to stop using this medicine for a short time. Any doctor orsurgeon who treats you should know that you are using the vaginal ring. Ethinyl estradiol and segesterone will not protect you from sexually transmitted diseases--including HIV and AIDS. Using a condom can help protectyou from these diseases. Whenever the ring is out of the vagina, wash the ring with mild soap and warm water, pat dry, and store in the case provided. Store at room temperature. Protect from heat, sunlight, and freezing. Follow the provided instructions forcleaning the vaginal ring when not in use. After using this medicine for 1 year, dispose of the used vaginal ring in the case or pouch it came in and throw it away where children and pets cannot getto it. Do not flush the ring down a toilet. What happens if I miss a dose? Call your doctor if you get off schedule, or if you have trouble removing a vaginal ring. What happens if I overdose?   An overdose of ethinyl estradiol and segesterone is not expected to bedangerous, but may cause nausea or vaginal bleeding. What should I avoid while using this medicine? Avoid leaving the ring in place for longer than 3 weeks. Do not use an oil-based vaginal product, such as a cream, gel, a suppository. You may use a water-based vaginallubricant but not one that contains oil or silicone. Ethinyl estradiol and segesterone may cause dark patches on the skin of your face. Avoid sunlight or tanning beds. Wear protective clothing and usesunscreen (SPF 30 or higher) when you are outdoors. You may need to avoid drinking grapefruit juice while using ethinyl estradioland segesterone with certain other medicines. Ask your doctor or pharmacist.  What are the possible side effects of ethinyl estradiol and segesterone? Get emergency medical help if you have signs of an allergic reaction: hives; difficult breathing; swelling of your face, lips, tongue, or throat. Call your doctor at once if you have:   very high blood pressure --severe headache, blurred vision, pounding in your neck or ears;   signs of a blood clot --sudden numbness or weakness (especially on one side of the body), sudden severe headache, problems with speech or vision, stabbing chest pain, feeling short of breath, pain or numbness in one or both legs;   heart attack symptoms --chest pain or pressure, pain spreading to your jaw or shoulder, nausea, sweating;   toxic shock syndrome --sudden fever, body aches, skin rash, vomiting, diarrhea, and feeling dizzy or light-headed;   new headaches, or a change in the pattern or severity of migraine headaches;   jaundice (yellowing of the skin or eyes); or   symptoms of depression --mood changes, thoughts about hurting yourself. Common side effects may include:   headache;   nausea, vomiting, diarrhea, stomach pain;   vaginal itching or discharge;   menstrual pain, breast tenderness;   irregular vaginal bleeding; or   pain or burning when you urinate;   This is not a complete list of side effects and others may occur. Call your doctor for medical advice about side effects. You may report side effects toFDA at 7-195-TWT-[phone]. What other drugs will affect ethinyl estradiol and segesterone? Some drugs can make birth control less effective, which may result in pregnancy. Use a barrier form of birth control (a male condom with spermicide, but not a female condom) with the vaginal ring if you also use any of the followingmedicines:   aprepitant, bosentan, Amanda's wort;   antifungal medicine;   antiviral medicine to treat hepatitis C or HIV/AIDS;   blood pressure medication;   seizure medicine;   steroid medicine;   thyroid medicine; or   tuberculosis medicine. Keep using the barrier birth control for at least 28 days after your last doseof any of these medicines. Ethinyl estradiol and segesterone can affect your blood levels of other drugs you take, which may increase side effects or make the medications less effective. Some drugs can affect your blood levels of ethinyl estradiol and segesterone if you take them with grapefruit juice. Tell your doctor about all your current medicines and any medicine you start or stop using. Many drugs can affect ethinyl estradiol and segesterone, including prescription and over-the-counter medicines, vitamins, and herbal products. Not all possible interactions are listed here. Where can I get more information? Your pharmacist can provide more information about ethinyl estradiol andsegesterone. Remember, keep this and all other medicines out of the reach of children, never share your medicines with others, and use this medication only for the indication prescribed. Every effort has been made to ensure that the information provided by [address] is accurate, up-to-date, and complete, but no guarantee is made to that effect. Drug information contained herein may be time sensitive.  [employer] information has been compiled for use by healthcare practitioners and consumers in the [place] and therefore [employer] does not warrant that uses outside of the [place] are appropriate, unless specifically indicated otherwise. [facility]'s drug information does not endorse drugs, diagnose patients or recommend therapy. [facility] drug information is an informational resource designed to assist licensed healthcare practitioners in caring for their patients and/or to serve consumers viewing this service as a supplement to, and not a substitute for, the expertise, skill, knowledge and judgment of healthcare practitioners. The absence of a warning for a given drug or drug combination in no way should be construed to indicate that the drug or drug combination is safe, effective or appropriate for any given patient. [facility] does not assume any responsibility for any aspect of healthcare administered with the aid of information [facility] provides. The information contained herein is not intended to cover all possible uses, directions, precautions, warnings, drug interactions, allergic reactions, or adverse effects. If you have questions about the drugs you are taking, check with yourdoctor, nurse or pharmacist.  Copyright 2207-0376 [address]. Version: 2.01. Revision date: 11/3/2020. Care instructions adapted under license by [facility] ([facility]). If you have questions about a medical condition or this instruction, always ask your healthcare professional. [place] [address] any warranty or liability for your use of this information.

## 2022-08-06 ENCOUNTER — HOSPITAL ENCOUNTER (OUTPATIENT)
Dept: LAB | Age: 32
Discharge: HOME | End: 2022-08-06
Payer: COMMERCIAL

## 2022-08-06 DIAGNOSIS — E28.2 PCOS (POLYCYSTIC OVARIAN SYNDROME): ICD-10-CM

## 2022-08-06 LAB
ALBUMIN SERPL-MCNC: 4 G/DL (ref 3.5–4.6)
ALP BLD-CCNC: 46 U/L (ref 40–130)
ALT SERPL-CCNC: 19 U/L (ref 0–33)
ANION GAP SERPL CALCULATED.3IONS-SCNC: 12 MEQ/L (ref 9–15)
AST SERPL-CCNC: 16 U/L (ref 0–35)
BILIRUB SERPL-MCNC: <0.2 MG/DL (ref 0.2–0.7)
BUN BLDV-MCNC: 10 MG/DL (ref 6–20)
CALCIUM SERPL-MCNC: 9.7 MG/DL (ref 8.5–9.9)
CHLORIDE BLD-SCNC: 103 MEQ/L (ref 95–107)
CO2: 23 MEQ/L (ref 20–31)
CORTISOL COLLECTION INFO: ABNORMAL
CORTISOL: 28.4 UG/DL (ref 2.7–18.4)
CREAT SERPL-MCNC: 0.88 MG/DL (ref 0.5–0.9)
GFR AFRICAN AMERICAN: >60
GFR NON-AFRICAN AMERICAN: >60
GLOBULIN: 3.5 G/DL (ref 2.3–3.5)
GLUCOSE BLD-MCNC: 95 MG/DL (ref 70–99)
HBA1C MFR BLD: 5.3 % (ref 4.8–5.9)
POTASSIUM SERPL-SCNC: 4.3 MEQ/L (ref 3.4–4.9)
SODIUM BLD-SCNC: 138 MEQ/L (ref 135–144)
TOTAL PROTEIN: 7.5 G/DL (ref 6.3–8)

## 2022-08-06 PROCEDURE — 82024 ASSAY OF ACTH: CPT

## 2022-08-06 PROCEDURE — 82533 TOTAL CORTISOL: CPT

## 2022-08-06 PROCEDURE — 36415 COLL VENOUS BLD VENIPUNCTURE: CPT

## 2022-08-06 PROCEDURE — 83525 ASSAY OF INSULIN: CPT

## 2022-08-06 PROCEDURE — 83036 HEMOGLOBIN GLYCOSYLATED A1C: CPT

## 2022-08-06 PROCEDURE — 83527 ASSAY OF INSULIN: CPT

## 2022-08-06 PROCEDURE — 80053 COMPREHEN METABOLIC PANEL: CPT

## 2022-08-10 LAB
ADRENOCORTICOTROPIC HORMONE: 76 PG/ML (ref 7.2–63.3)
INSULIN FREE: 9 UIU/ML (ref 3–25)
INSULIN: 13 UIU/ML (ref 3–25)

## 2022-08-10 ASSESSMENT — SOCIAL DETERMINANTS OF HEALTH (SDOH)

## 2022-08-11 ENCOUNTER — OFFICE VISIT (OUTPATIENT)
Dept: OBGYN CLINIC | Age: 32
End: 2022-08-11
Payer: COMMERCIAL

## 2022-08-11 ENCOUNTER — TELEPHONE (OUTPATIENT)
Dept: ENDOCRINOLOGY | Age: 32
End: 2022-08-11

## 2022-08-11 ENCOUNTER — HOSPITAL ENCOUNTER (OUTPATIENT)
Age: 32
Setting detail: SPECIMEN
Discharge: HOME | End: 2022-08-11
Payer: COMMERCIAL

## 2022-08-11 VITALS
SYSTOLIC BLOOD PRESSURE: 110 MMHG | HEIGHT: 68 IN | HEART RATE: 82 BPM | WEIGHT: 253 LBS | DIASTOLIC BLOOD PRESSURE: 72 MMHG | BODY MASS INDEX: 38.34 KG/M2

## 2022-08-11 DIAGNOSIS — R10.2 PELVIC PAIN: Primary | ICD-10-CM

## 2022-08-11 DIAGNOSIS — R10.2 PELVIC PAIN: ICD-10-CM

## 2022-08-11 PROCEDURE — 87491 CHLMYD TRACH DNA AMP PROBE: CPT

## 2022-08-11 PROCEDURE — 87591 N.GONORRHOEAE DNA AMP PROB: CPT

## 2022-08-11 PROCEDURE — 87210 SMEAR WET MOUNT SALINE/INK: CPT

## 2022-08-11 PROCEDURE — 99204 OFFICE O/P NEW MOD 45 MIN: CPT | Performed by: OBSTETRICS & GYNECOLOGY

## 2022-08-11 PROCEDURE — 87808 TRICHOMONAS ASSAY W/OPTIC: CPT

## 2022-08-11 ASSESSMENT — ENCOUNTER SYMPTOMS
APNEA: 0
SHORTNESS OF BREATH: 0

## 2022-08-11 NOTE — PROGRESS NOTES
Subjective:      Patient ID:  Lord Pinzon is a 33 y.o. female with chief complaint of:  Chief Complaint   Patient presents with    Pelvic Pain     Pelvic pain that radiates around to her back       Patient presents with new onset of pelvic pain that radiates through the back. Patient states he has been just for recent few months patient is currently using Annovera for birth control. Patient denies any intercourse pain. Past Medical History:   Diagnosis Date    PCOS (polycystic ovarian syndrome)     Shingles     Thyroid disease      Past Surgical History:   Procedure Laterality Date    LAPAROSCOPY       Family History   Problem Relation Age of Onset    Cancer Mother         multiple myeloma    No Known Problems Father     Stroke Maternal Aunt      Current Outpatient Medications on File Prior to Visit   Medication Sig Dispense Refill    Segesterone-Ethinyl Estradiol 0.15-0.013 MG/24HR RING Insert 1 ring vaginally. Following insertion, ring should remain in place for 21 continuous days (3 weeks), then removed for 7 days (1 week). This pattern (3 weeks in and 1 week out) is one cycle; one ring provides contraception for 13 cycles (1 year). 3 each 1    sertraline (ZOLOFT) 50 MG tablet Take 1 tablet by mouth daily 90 tablet 1    spironolactone (ALDACTONE) 50 MG tablet TAKE 1 TABLET BY MOUTH TWICE A  tablet 1     No current facility-administered medications on file prior to visit. Allergies:  Codeine    Review of Systems   Constitutional:  Negative for fatigue and fever. Respiratory:  Negative for apnea and shortness of breath. Cardiovascular:  Negative for chest pain and palpitations. Genitourinary:  Positive for pelvic pain. Negative for difficulty urinating, dyspareunia, dysuria, menstrual problem, vaginal bleeding and vaginal discharge. Neurological:  Negative for dizziness, weakness and light-headedness. Psychiatric/Behavioral:  Negative for agitation and dysphoric mood. Objective:   /72   Pulse 82   Ht 5' 7.5\" (1.715 m)   Wt 253 lb (114.8 kg)   BMI 39.04 kg/m²      Physical Exam  Constitutional:       Appearance: Normal appearance. She is well-developed. Eyes:      Pupils: Pupils are equal, round, and reactive to light. Cardiovascular:      Rate and Rhythm: Normal rate and regular rhythm. Heart sounds: Normal heart sounds. Pulmonary:      Effort: Pulmonary effort is normal.   Abdominal:      General: Bowel sounds are normal.      Palpations: Abdomen is soft. Genitourinary:     Labia:         Right: No rash, tenderness or lesion. Left: No rash, tenderness or lesion. Vagina: Vaginal discharge present. No erythema, tenderness, bleeding or prolapsed vaginal walls. Cervix: Discharge and friability present. Uterus: Not enlarged and not tender. Adnexa:         Right: No mass or tenderness. Left: No mass or tenderness. Neurological:      Mental Status: She is alert and oriented to person, place, and time. Assessment:       Diagnosis Orders   1. Pelvic pain  C.trachomatis N.gonorrhoeae DNA    Wet prep, genital    US NON OB TRANSVAGINAL    US PELVIS COMPLETE            Plan: We will get a baseline ultrasound at this time and some labs this change is most likely related to pelvic congestion premenstrual.  Orders Placed This Encounter   Procedures    C.trachomatis N.gonorrhoeae DNA     Standing Status:   Future     Standing Expiration Date:   8/11/2023    Wet prep, genital     Standing Status:   Future     Standing Expiration Date:   8/11/2023    US NON OB TRANSVAGINAL     Standing Status:   Future     Standing Expiration Date:   8/11/2023    US PELVIS COMPLETE     Standing Status:   Future     Standing Expiration Date:   8/11/2023     No orders of the defined types were placed in this encounter. No follow-ups on file.      Andrés Salvador DO

## 2022-08-11 NOTE — TELEPHONE ENCOUNTER
----- Message from Michelle Zambranoma sent at 8/8/2022  9:18 AM EDT -----  Notify patient testing was normal. No further action/change at this time. Follow up per routine plan.

## 2022-08-12 LAB
CLUE CELLS: NORMAL
TRICHOMONAS PREP: NORMAL
TRICHOMONAS VAGINALIS SCREEN: NEGATIVE
YEAST WET PREP: NORMAL

## 2022-08-13 ENCOUNTER — HOSPITAL ENCOUNTER (OUTPATIENT)
Dept: ULTRASOUND IMAGING | Age: 32
Discharge: HOME | End: 2022-08-15
Payer: COMMERCIAL

## 2022-08-13 DIAGNOSIS — R10.2 PELVIC PAIN: ICD-10-CM

## 2022-08-13 PROCEDURE — 76856 US EXAM PELVIC COMPLETE: CPT

## 2022-08-13 PROCEDURE — 76830 TRANSVAGINAL US NON-OB: CPT

## 2022-08-16 LAB
C TRACH DNA GENITAL QL NAA+PROBE: NEGATIVE
N. GONORRHOEAE DNA: NEGATIVE

## 2022-08-19 ENCOUNTER — OFFICE VISIT (OUTPATIENT)
Dept: ENDOCRINOLOGY | Age: 32
End: 2022-08-19
Payer: COMMERCIAL

## 2022-08-19 VITALS
OXYGEN SATURATION: 99 % | BODY MASS INDEX: 38.34 KG/M2 | HEART RATE: 83 BPM | SYSTOLIC BLOOD PRESSURE: 118 MMHG | DIASTOLIC BLOOD PRESSURE: 72 MMHG | HEIGHT: 68 IN | WEIGHT: 253 LBS

## 2022-08-19 DIAGNOSIS — E24.9 HYPERCORTICISM (HCC): ICD-10-CM

## 2022-08-19 DIAGNOSIS — E28.2 PCOS (POLYCYSTIC OVARIAN SYNDROME): Primary | ICD-10-CM

## 2022-08-19 PROCEDURE — 99214 OFFICE O/P EST MOD 30 MIN: CPT | Performed by: PHYSICIAN ASSISTANT

## 2022-08-19 RX ORDER — ONDANSETRON 4 MG/1
4 TABLET, ORALLY DISINTEGRATING ORAL 3 TIMES DAILY PRN
Qty: 21 TABLET | Refills: 0 | Status: SHIPPED | OUTPATIENT
Start: 2022-08-19

## 2022-08-19 RX ORDER — DEXAMETHASONE 1 MG
TABLET ORAL
Qty: 1 TABLET | Refills: 0 | Status: SHIPPED | OUTPATIENT
Start: 2022-08-19

## 2022-08-19 RX ORDER — LIRAGLUTIDE 6 MG/ML
INJECTION, SOLUTION SUBCUTANEOUS
Qty: 3 PEN | Refills: 3 | Status: SHIPPED | OUTPATIENT
Start: 2022-08-19

## 2022-08-19 RX ORDER — OMEPRAZOLE 20 MG/1
20 CAPSULE, DELAYED RELEASE ORAL DAILY
Qty: 30 CAPSULE | Refills: 3 | Status: SHIPPED | OUTPATIENT
Start: 2022-08-19 | End: 2022-09-08 | Stop reason: SDUPTHER

## 2022-08-19 ASSESSMENT — ENCOUNTER SYMPTOMS
WHEEZING: 0
NAUSEA: 0
RHINORRHEA: 0
SORE THROAT: 0
COUGH: 0
SHORTNESS OF BREATH: 0
DIARRHEA: 0
SINUS PRESSURE: 0
ABDOMINAL PAIN: 0
VOMITING: 0

## 2022-08-19 NOTE — PATIENT INSTRUCTIONS
Continue spironolactone (Aldactone) 50 mg once a day   Repeat morning labs in the next 1 to 2 weeks before 8:00 AM  Take dexamethasone 1 mg tablet at 11 PM the night before your blood draw  We will make further recommendations based on those results  Start Omeprazole 20 mg daily   Start Saxenda Inject 0.6 mg daily for one week, increase by 0.6 mg weekly up to 3 mg  Zofran 4 mg ODT as needed for nausea   Follow up in 3 months

## 2022-08-19 NOTE — PROGRESS NOTES
8/19/2022    Assessment:       Diagnosis Orders   1. PCOS (polycystic ovarian syndrome)  Comprehensive Metabolic Panel    Hemoglobin A1C      2. Hypercorticism (HCC)  Cortisol Total          PLAN:     Continue spironolactone (Aldactone) 50 mg once a day   Repeat morning labs in the next 1 to 2 weeks before 8:00 AM  Take dexamethasone 1 mg tablet at 11 PM the night before your blood draw  We will make further recommendations based on those results  Start Omeprazole 20 mg daily   Start Saxenda Inject 0.6 mg daily for one week, increase by 0.6 mg weekly up to 3 mg  Zofran 4 mg ODT as needed for nausea   Follow up in 3 months       Orders Placed This Encounter   Procedures    Cortisol Total     Standing Status:   Future     Standing Expiration Date:   8/19/2023     Order Specific Question:   8AM or 4PM?     Answer:   8 am    Comprehensive Metabolic Panel     Standing Status:   Future     Standing Expiration Date:   8/19/2023    Hemoglobin A1C     Standing Status:   Future     Standing Expiration Date:   8/19/2023       Orders Placed This Encounter   Medications    dexamethasone (DECADRON) 1 MG tablet     Sig: Take at 11:00 PM the night before your lab draw     Dispense:  1 tablet     Refill:  0    liraglutide-weight management (SAXENDA) 18 MG/3ML SOPN     Sig: Inject 0.6 mg daily for one week, increase by 0.6 mg weekly up to 3 mg     Dispense:  3 pen     Refill:  3    omeprazole (PRILOSEC) 20 MG delayed release capsule     Sig: Take 1 capsule by mouth Daily     Dispense:  30 capsule     Refill:  3    ondansetron (ZOFRAN-ODT) 4 MG disintegrating tablet     Sig: Take 1 tablet by mouth 3 times daily as needed for Nausea or Vomiting     Dispense:  21 tablet     Refill:  0       No follow-ups on file.   Subjective:     Chief Complaint   Patient presents with    Follow-up    Other     PCOS     Vitals:    08/19/22 1037   BP: 118/72   Site: Left Upper Arm   Pulse: 83   SpO2: 99%   Weight: 253 lb (114.8 kg)   Height: 5' 7.5\" (1.715 m)     Wt Readings from Last 3 Encounters:   08/19/22 253 lb (114.8 kg)   08/11/22 253 lb (114.8 kg)   06/10/22 251 lb 9.6 oz (114.1 kg)     BP Readings from Last 3 Encounters:   08/19/22 118/72   08/11/22 110/72   06/10/22 128/62     Patient is a 72-year-old -American female who presents for evaluation of her PCOS today. She was diagnosed approximately 20 years ago, has never taken any medication for that. She has had history of dysmenorrhea with 2 to 3 months between very heavy menstrual cycles. This has improved on her current OCP NuvaRing. Thorough review of laboratories and previous records show no hyperglycemia, hyper or hypocalcemia A1c is normal.  Vital signs are normal.  She denies any hair skin or nail changes. She has however been experiencing intermittent night sweats 4-5 times a week that seem to be getting worse in the last year. Talked about the pathophysiology of PCOS the risk of diabetes due to insulin resistance and hyperlipidemia and hypercalcemia. We discussed the need for lifestyle changes including strict exercise regimen dietary restrictions. She verbalized understanding. Going to order nutritional services consultation. We discussed utilizing a GLP-1 or Adipex in the future for weight loss when she has a fitness program in place.     Past Medical History:   Diagnosis Date    PCOS (polycystic ovarian syndrome)     Shingles     Thyroid disease      Past Surgical History:   Procedure Laterality Date    LAPAROSCOPY       Social History     Socioeconomic History    Marital status: Single     Spouse name: Not on file    Number of children: Not on file    Years of education: Not on file    Highest education level: Not on file   Occupational History    Not on file   Tobacco Use    Smoking status: Never    Smokeless tobacco: Never   Vaping Use    Vaping Use: Never used   Substance and Sexual Activity    Alcohol use: Yes     Comment: socially    Drug use: Never    Sexual activity: Yes     Partners: Male   Other Topics Concern    Not on file   Social History Narrative    Not on file     Social Determinants of Health     Financial Resource Strain: Low Risk     Difficulty of Paying Living Expenses: Not hard at all   Food Insecurity: No Food Insecurity    Worried About Running Out of Food in the Last Year: Never true    Ran Out of Food in the Last Year: Never true   Transportation Needs: Not on file   Physical Activity: Sufficiently Active    Days of Exercise per Week: 5 days    Minutes of Exercise per Session: 50 min   Stress: Not on file   Social Connections: Not on file   Intimate Partner Violence: Not At Risk    Fear of Current or Ex-Partner: No    Emotionally Abused: No    Physically Abused: No    Sexually Abused: No   Housing Stability: Not on file     Family History   Problem Relation Age of Onset    Cancer Mother         multiple myeloma    No Known Problems Father     Stroke Maternal Aunt      Allergies   Allergen Reactions    Codeine        Current Outpatient Medications:     dexamethasone (DECADRON) 1 MG tablet, Take at 11:00 PM the night before your lab draw, Disp: 1 tablet, Rfl: 0    liraglutide-weight management (SAXENDA) 18 MG/3ML SOPN, Inject 0.6 mg daily for one week, increase by 0.6 mg weekly up to 3 mg, Disp: 3 pen, Rfl: 3    omeprazole (PRILOSEC) 20 MG delayed release capsule, Take 1 capsule by mouth Daily, Disp: 30 capsule, Rfl: 3    ondansetron (ZOFRAN-ODT) 4 MG disintegrating tablet, Take 1 tablet by mouth 3 times daily as needed for Nausea or Vomiting, Disp: 21 tablet, Rfl: 0    Segesterone-Ethinyl Estradiol 0.15-0.013 MG/24HR RING, Insert 1 ring vaginally. Following insertion, ring should remain in place for 21 continuous days (3 weeks), then removed for 7 days (1 week). This pattern (3 weeks in and 1 week out) is one cycle; one ring provides contraception for 13 cycles (1 year). , Disp: 3 each, Rfl: 1    sertraline (ZOLOFT) 50 MG tablet, Take 1 tablet by mouth daily, Disp: 90 tablet, Rfl: 1    spironolactone (ALDACTONE) 50 MG tablet, TAKE 1 TABLET BY MOUTH TWICE A DAY, Disp: 180 tablet, Rfl: 1  Lab Results   Component Value Date     08/06/2022    K 4.3 08/06/2022     08/06/2022    CO2 23 08/06/2022    BUN 10 08/06/2022    CREATININE 0.88 08/06/2022    GLUCOSE 95 08/06/2022    CALCIUM 9.7 08/06/2022    PROT 7.5 08/06/2022    LABALBU 4.0 08/06/2022    BILITOT <0.2 08/06/2022    ALKPHOS 46 08/06/2022    AST 16 08/06/2022    ALT 19 08/06/2022    LABGLOM >60.0 08/06/2022    GFRAA >60.0 08/06/2022    GLOB 3.5 08/06/2022     Lab Results   Component Value Date    WBC 5.7 06/26/2021    HGB 13.1 06/26/2021    HCT 39.6 06/26/2021    MCV 83.4 06/26/2021     06/26/2021     Lab Results   Component Value Date    LABA1C 5.3 08/06/2022    LABA1C 5.4 06/26/2021      Latest Reference Range & Units 8/6/22 11:27   CORTISOL 2.7 - 18.4 ug/dL 28.4 (H)   (H): Data is abnormally high     Latest Reference Range & Units 8/6/22 11:28   ACTH 7.2 - 63.3 pg/mL 76.0 (H)   (H): Data is abnormally high     Ref. Range 6/26/2021 10:36   DHEA (Dehydroepiandrosterone) Latest Ref Range: 1.330 - 7.780 ng/mL 6. 113       Results for Jose Ramon Members (MRN [id number]) as of 7/16/2021 12:28   Ref. Range 6/26/2021 10:36   Sex Hormone Binding Latest Ref Range: 30 - 135 nmol/L 171 (H)   Testosterone, Free Latest Ref Range: 0.8 - 7.4 pg/mL 3.3   Testosterone, Females/Children Latest Ref Range: 9 - 55 ng/dL 66 (H)   Results for Jose Ramon Members (MRN [id number]) as of 7/16/2021 12:28   Ref. Range 6/26/2021 10:36   Insulin Latest Ref Range: 2.6 - 24.9 uIU/mL 11.2   Results for Jose Ramon Members (MRN [id number]) as of 7/16/2021 12:28   Ref.  Range 6/26/2021 10:36   Prolactin Latest Units: ng/mL 20.0      Latest Reference Range & Units 8/6/22 11:27   Insulin, Free 3 - 25 uIU/mL 9   INSULIN,INS 3 - 25 uIU/mL 13     Lab Results   Component Value Date    HDL 66 (H) 06/26/2021    LDLCALC 98 06/26/2021    CHOL 183 06/26/2021    TRIG 95 06/26/2021     No results found for: TESTM  Lab Results   Component Value Date    TSH 1.080 06/26/2021    T4FREE 1.36 06/26/2021     Lab Results   Component Value Date    TPOABS <4.0 06/26/2021       Review of Systems   Constitutional:  Negative for chills, fatigue and fever. HENT:  Negative for congestion, ear pain, postnasal drip, rhinorrhea, sinus pressure and sore throat. Eyes:  Negative for visual disturbance. Respiratory:  Negative for cough, shortness of breath and wheezing. Cardiovascular:  Negative for chest pain, palpitations and leg swelling. Gastrointestinal:  Negative for abdominal pain, diarrhea, nausea and vomiting. Endocrine: Negative for cold intolerance, heat intolerance, polydipsia and polyuria. Genitourinary:  Negative for difficulty urinating. Musculoskeletal:  Negative for arthralgias. Skin:  Negative for rash. Allergic/Immunologic: Negative for environmental allergies. Neurological:  Negative for dizziness and headaches. Hematological:  Does not bruise/bleed easily. Psychiatric/Behavioral:  Negative for dysphoric mood. Objective:   Physical Exam  Constitutional:       Appearance: Normal appearance. She is well-developed. She is not ill-appearing. HENT:      Head: Normocephalic and atraumatic. Nose: No congestion. Eyes:      Conjunctiva/sclera: Conjunctivae normal.   Neck:      Comments: No thyromegaly or palpable nodules  Cardiovascular:      Rate and Rhythm: Normal rate and regular rhythm. Heart sounds: Normal heart sounds. Pulmonary:      Effort: Pulmonary effort is normal.      Breath sounds: Normal breath sounds. Abdominal:      General: Bowel sounds are normal.      Palpations: Abdomen is soft. Musculoskeletal:         General: Normal range of motion. Cervical back: Normal range of motion and neck supple. Skin:     General: Skin is warm and dry. Neurological:      General: No focal deficit present.

## 2022-08-23 ENCOUNTER — TELEPHONE (OUTPATIENT)
Dept: ENDOCRINOLOGY | Age: 32
End: 2022-08-23

## 2022-09-07 ENCOUNTER — OFFICE VISIT (OUTPATIENT)
Dept: FAMILY MEDICINE CLINIC | Age: 32
End: 2022-09-07
Payer: COMMERCIAL

## 2022-09-07 VITALS
HEART RATE: 76 BPM | DIASTOLIC BLOOD PRESSURE: 67 MMHG | OXYGEN SATURATION: 97 % | HEIGHT: 67 IN | SYSTOLIC BLOOD PRESSURE: 100 MMHG | WEIGHT: 250 LBS | TEMPERATURE: 97.2 F | BODY MASS INDEX: 39.24 KG/M2

## 2022-09-07 DIAGNOSIS — J06.9 VIRAL URI WITH COUGH: Primary | ICD-10-CM

## 2022-09-07 LAB
INFLUENZA A ANTIBODY: NORMAL
INFLUENZA B ANTIBODY: NORMAL
Lab: NORMAL
PERFORMING INSTRUMENT: NORMAL
QC PASS/FAIL: NORMAL
SARS-COV-2, POC: NORMAL

## 2022-09-07 PROCEDURE — 87804 INFLUENZA ASSAY W/OPTIC: CPT

## 2022-09-07 PROCEDURE — 87426 SARSCOV CORONAVIRUS AG IA: CPT

## 2022-09-07 PROCEDURE — 99213 OFFICE O/P EST LOW 20 MIN: CPT

## 2022-09-07 ASSESSMENT — ENCOUNTER SYMPTOMS
NAUSEA: 1
DIARRHEA: 0
EYES NEGATIVE: 1
RHINORRHEA: 1
CHEST TIGHTNESS: 0
SHORTNESS OF BREATH: 0
ABDOMINAL PAIN: 0
VOMITING: 0
WHEEZING: 0
COUGH: 1
SINUS PRESSURE: 0
SORE THROAT: 0

## 2022-09-07 NOTE — PATIENT INSTRUCTIONS
Use Sudafed (pseudoephedrine)  for symptom of sinus congestion. Tussin cough syrup (Dextromethorphan) if needed to treat symptom of irritating cough. Mucinex (guaifenesin)  is indicated if you have chest mucus that you are having difficulty coughing up. Expect up to a 7 day course of the illness. Symptoms usually begin to improve between days 3 and 5. It is important to REST and increase water intake. Watch for signs of dehydration such as feeling light headed, reduced urine output fatigue or shortness of breath when walking.   Return immediately if you experience these symptoms or fevers that cannot be controlled

## 2022-09-07 NOTE — PROGRESS NOTES
[address] Encounter        ASSESSMENT/PLAN     William Allen is a 33 y.o. female who presents with:  Runny nose fever up to 99.9 and nonproductive cough and mild nausea starting Monday patient reports symptoms are mildly worse today. Patient denies sore throat. On examination bilateral ears have cerumen obstructing the TM. Canals are otherwise normal.  Neck is supple there are no masses. Patient denies mastoid or tragal pain. Pharynx is mildly erythemic posteriorly there is no tonsillar enlargement. Lung sounds are clear throughout. 1. Viral URI with cough      Order for Debrox treat cerumen buildup in bilateral ears. Patient advised to use only for 4 days. Patient educated on expectation for course of viral illness recommended therapies for congestion and cough if needed. Moderate work note to be off for 2 days. PATIENT REFERRED TO:  Return if symptoms worsen or fail to improve. DISCHARGE MEDICATIONS:  New Prescriptions    CARBAMIDE PEROXIDE (DEBROX) 6.5 % OTIC SOLUTION    Place 5 drops in ear(s) 2 times daily for 4 days     Cannot display discharge medications since this is not an admission. Eva Greco, APRN - CNP    CHIEF COMPLAINT       Chief Complaint   Patient presents with    URI     Headache, nausea, chills. SUBJECTIVE/REVIEW OF SYSTEMS     Review of Systems   Constitutional:  Positive for fever. Negative for chills and fatigue. HENT:  Positive for rhinorrhea. Negative for congestion, ear pain, hearing loss, sinus pressure and sore throat. Eyes: Negative. Respiratory:  Positive for cough. Negative for chest tightness, shortness of breath and wheezing. Cardiovascular:  Negative for chest pain and palpitations. Gastrointestinal:  Positive for nausea. Negative for abdominal pain, diarrhea and vomiting. Endocrine: Negative. Musculoskeletal:  Negative for arthralgias and myalgias. Skin:  Negative for rash. Neurological:  Negative for dizziness, weakness, light-headedness and headaches. Hematological:  Negative for adenopathy. Psychiatric/Behavioral: Negative. OBJECTIVE/PHYSICAL EXAM     Physical Exam  Vitals reviewed. Constitutional:       Appearance: Normal appearance. She is not ill-appearing or toxic-appearing. HENT:      Head: Normocephalic. Right Ear: Tympanic membrane, ear canal and external ear normal. No drainage, swelling or tenderness. There is impacted cerumen. No mastoid tenderness. Left Ear: Tympanic membrane, ear canal and external ear normal. No drainage, swelling or tenderness. There is impacted cerumen. No mastoid tenderness. Nose: Rhinorrhea present. No congestion. Mouth/Throat:      Mouth: Mucous membranes are moist.      Pharynx: Oropharynx is clear. Posterior oropharyngeal erythema present. No pharyngeal swelling or oropharyngeal exudate. Tonsils: No tonsillar exudate or tonsillar abscesses. 0 on the right. 0 on the left. Eyes:      General:         Right eye: No discharge. Left eye: No discharge. Cardiovascular:      Rate and Rhythm: Normal rate and regular rhythm. Pulses: Normal pulses. Heart sounds: Normal heart sounds. No murmur heard. No gallop. Pulmonary:      Effort: Pulmonary effort is normal. No respiratory distress. Breath sounds: Normal breath sounds. No stridor. No wheezing, rhonchi or rales. Chest:      Chest wall: No tenderness. Abdominal:      General: Abdomen is flat. Musculoskeletal:      Cervical back: No rigidity or tenderness. Lymphadenopathy:      Head:      Right side of head: No submandibular or tonsillar adenopathy. Left side of head: No submandibular or tonsillar adenopathy. Cervical: No cervical adenopathy. Skin:     General: Skin is warm and dry. Capillary Refill: Capillary refill takes less than 2 seconds. Coloration: Skin is not pale.    Neurological:      Mental Status: She is alert and oriented to person, place, and time. Mental status is at baseline. Psychiatric:         Mood and Affect: Mood normal.         Behavior: Behavior normal.       VITALS  BP: 100/67, Temp: 97.2 °F (36.2 °C), Temp Source: Temporal, Heart Rate: 76,  , SpO2: 97 %      PAST MEDICAL HISTORY         Diagnosis Date    PCOS (polycystic ovarian syndrome)     Shingles     Thyroid disease      SURGICAL HISTORY     Patient  has a past surgical history that includes laparoscopy. CURRENT MEDICATIONS       Previous Medications    DEXAMETHASONE (DECADRON) 1 MG TABLET    Take at 11:00 PM the night before your lab draw    LIRAGLUTIDE-WEIGHT MANAGEMENT (SAXENDA) 18 MG/3ML SOPN    Inject 0.6 mg daily for one week, increase by 0.6 mg weekly up to 3 mg    OMEPRAZOLE (PRILOSEC) 20 MG DELAYED RELEASE CAPSULE    Take 1 capsule by mouth Daily    ONDANSETRON (ZOFRAN-ODT) 4 MG DISINTEGRATING TABLET    Take 1 tablet by mouth 3 times daily as needed for Nausea or Vomiting    SEGESTERONE-ETHINYL ESTRADIOL 0.15-0.013 MG/24HR RING    Insert 1 ring vaginally. Following insertion, ring should remain in place for 21 continuous days (3 weeks), then removed for 7 days (1 week). This pattern (3 weeks in and 1 week out) is one cycle; one ring provides contraception for 13 cycles (1 year). SERTRALINE (ZOLOFT) 50 MG TABLET    Take 1 tablet by mouth daily    SPIRONOLACTONE (ALDACTONE) 50 MG TABLET    TAKE 1 TABLET BY MOUTH TWICE A DAY     ALLERGIES     Patient is is allergic to codeine. FAMILY HISTORY     Patient'sfamily history includes Cancer in her mother; No Known Problems in her father; Stroke in her maternal aunt. HISTORY     Patient  reports that she has never smoked. She has never been exposed to tobacco smoke. She has never used smokeless tobacco. She reports current alcohol use. She reports that she does not use drugs.   READY CARE COURSE     Orders Placed This Encounter   Procedures    POCT COVID-19, Antigen     Order Specific Question:   Is this test for diagnosis or screening? Answer:   Diagnosis of ill patient     Order Specific Question:   Symptomatic for COVID-19 as defined by CDC? Answer:   Yes     Order Specific Question:   Date of Symptom Onset     Answer:   9/5/2022     Order Specific Question:   Hospitalized for COVID-19? Answer:   No     Order Specific Question:   Admitted to ICU for COVID-19? Answer:   No     Order Specific Question:   Employed in healthcare setting? Answer:   Unknown     Order Specific Question:   Resident in a congregate (group) care setting? Answer:   No     Order Specific Question:   Pregnant? Answer:   No     Order Specific Question:   Previously tested for COVID-19? Answer:   Unknown    POCT Influenza A/B        Labs:  Results for POC orders placed in visit on 09/07/22   POCT Influenza A/B   Result Value Ref Range    Influenza A Ab neg     Influenza B Ab neg      IMAGING:  No orders to display     Scheduled Meds:  Continuous Infusions:  PRN Meds:.

## 2022-09-07 NOTE — LETTER
[address]  Phone: [phone]  Fax: [phone]    GIGI Caruso CNP        September 7, 2022     Patient: Josias Dillon   YOB: 1990   Date of Visit: 9/7/2022       To Whom It May Concern: It is my medical opinion that Dariela Uribe may return to work on 9/9/2022. If you have any questions or concerns, please don't hesitate to call.     Sincerely,        GIGI Caruso CNP

## 2022-09-08 RX ORDER — OMEPRAZOLE 20 MG/1
20 CAPSULE, DELAYED RELEASE ORAL DAILY
Qty: 90 CAPSULE | Refills: 1 | Status: SHIPPED | OUTPATIENT
Start: 2022-09-08

## 2022-09-19 RX ORDER — DEXAMETHASONE 1 MG
TABLET ORAL
Qty: 1 TABLET | Refills: 3 | OUTPATIENT
Start: 2022-09-19

## 2022-09-24 ENCOUNTER — HOSPITAL ENCOUNTER (OUTPATIENT)
Dept: LAB | Age: 32
Discharge: HOME | End: 2022-09-24
Payer: COMMERCIAL

## 2022-09-24 DIAGNOSIS — E28.2 PCOS (POLYCYSTIC OVARIAN SYNDROME): ICD-10-CM

## 2022-09-24 DIAGNOSIS — E24.9 HYPERCORTICISM (HCC): ICD-10-CM

## 2022-09-24 LAB
ALBUMIN SERPL-MCNC: 4 G/DL (ref 3.5–4.6)
ALP BLD-CCNC: 49 U/L (ref 40–130)
ALT SERPL-CCNC: 15 U/L (ref 0–33)
ANION GAP SERPL CALCULATED.3IONS-SCNC: 14 MEQ/L (ref 9–15)
AST SERPL-CCNC: 15 U/L (ref 0–35)
BILIRUB SERPL-MCNC: <0.2 MG/DL (ref 0.2–0.7)
BUN BLDV-MCNC: 13 MG/DL (ref 6–20)
CALCIUM SERPL-MCNC: 9.6 MG/DL (ref 8.5–9.9)
CHLORIDE BLD-SCNC: 103 MEQ/L (ref 95–107)
CO2: 22 MEQ/L (ref 20–31)
CORTISOL COLLECTION INFO: NORMAL
CORTISOL: 2.8 UG/DL (ref 2.7–18.4)
CREAT SERPL-MCNC: 0.74 MG/DL (ref 0.5–0.9)
GFR AFRICAN AMERICAN: >60
GFR NON-AFRICAN AMERICAN: >60
GLOBULIN: 3.5 G/DL (ref 2.3–3.5)
GLUCOSE BLD-MCNC: 98 MG/DL (ref 70–99)
HBA1C MFR BLD: 5.6 % (ref 4.8–5.9)
POTASSIUM SERPL-SCNC: 4 MEQ/L (ref 3.4–4.9)
SODIUM BLD-SCNC: 139 MEQ/L (ref 135–144)
TOTAL PROTEIN: 7.5 G/DL (ref 6.3–8)

## 2022-09-24 PROCEDURE — 80053 COMPREHEN METABOLIC PANEL: CPT

## 2022-09-24 PROCEDURE — 82533 TOTAL CORTISOL: CPT

## 2022-09-24 PROCEDURE — 83036 HEMOGLOBIN GLYCOSYLATED A1C: CPT

## 2022-09-24 PROCEDURE — 36415 COLL VENOUS BLD VENIPUNCTURE: CPT

## 2022-11-17 ENCOUNTER — OFFICE VISIT (OUTPATIENT)
Dept: FAMILY MEDICINE CLINIC | Age: 32
End: 2022-11-17
Payer: COMMERCIAL

## 2022-11-17 VITALS
DIASTOLIC BLOOD PRESSURE: 72 MMHG | SYSTOLIC BLOOD PRESSURE: 114 MMHG | WEIGHT: 260.2 LBS | HEART RATE: 89 BPM | HEIGHT: 68 IN | OXYGEN SATURATION: 98 % | TEMPERATURE: 97.4 F | BODY MASS INDEX: 39.43 KG/M2

## 2022-11-17 DIAGNOSIS — B34.9 VIRAL ILLNESS: ICD-10-CM

## 2022-11-17 DIAGNOSIS — R51.9 ACUTE NONINTRACTABLE HEADACHE, UNSPECIFIED HEADACHE TYPE: ICD-10-CM

## 2022-11-17 DIAGNOSIS — R53.83 OTHER FATIGUE: Primary | ICD-10-CM

## 2022-11-17 LAB
CONTROL: NORMAL
INFLUENZA A ANTIBODY: NORMAL
INFLUENZA B ANTIBODY: NORMAL
Lab: NORMAL
PERFORMING INSTRUMENT: NORMAL
PREGNANCY TEST URINE, POC: NORMAL
QC PASS/FAIL: NORMAL
SARS-COV-2, POC: NORMAL

## 2022-11-17 PROCEDURE — 87804 INFLUENZA ASSAY W/OPTIC: CPT

## 2022-11-17 PROCEDURE — 99214 OFFICE O/P EST MOD 30 MIN: CPT

## 2022-11-17 PROCEDURE — 87426 SARSCOV CORONAVIRUS AG IA: CPT

## 2022-11-17 PROCEDURE — 81025 URINE PREGNANCY TEST: CPT

## 2022-11-17 RX ORDER — PSEUDOEPHEDRINE HCL 30 MG
30 TABLET ORAL EVERY 6 HOURS PRN
Qty: 20 TABLET | Refills: 0 | Status: SHIPPED | OUTPATIENT
Start: 2022-11-17 | End: 2022-11-22

## 2022-11-17 RX ORDER — ONDANSETRON 4 MG/1
4 TABLET, ORALLY DISINTEGRATING ORAL 3 TIMES DAILY PRN
Qty: 9 TABLET | Refills: 0 | Status: SHIPPED | OUTPATIENT
Start: 2022-11-17 | End: 2022-11-20

## 2022-11-17 ASSESSMENT — ENCOUNTER SYMPTOMS
NAUSEA: 1
VOMITING: 0
DIARRHEA: 0
SHORTNESS OF BREATH: 0
SORE THROAT: 0
SINUS PRESSURE: 0
EYES NEGATIVE: 1
ABDOMINAL PAIN: 0
WHEEZING: 0
CHEST TIGHTNESS: 1
RHINORRHEA: 0
COUGH: 0

## 2022-11-17 NOTE — PATIENT INSTRUCTIONS
Use Tylenol Ibuprofen to treat pain or fever, any fever greater than 101F should be treated. Use Sudafed (pseudoephedrine) for the treatment of sinus congestion     Tussin cough syrup (Dextromethorphan) if needed to treat symptom of irritating cough. Mucinex (guaifenesin)  is indicated if you have chest mucus that you are having difficulty coughing up. Mucinex will make mucus more watery, but may increase the amount of mucus    Expect up to a 7 day course of the illness. Symptoms usually begin to improve between days 3 and 5. It is important to REST and increase water intake. Watch for signs of worsening illness such as feeling light headed, reduced urine output, or shortness of breath when walking.   Return immediately if you experience these symptoms or fevers that cannot be controlled

## 2022-11-17 NOTE — PROGRESS NOTES
[address] Encounter        ASSESSMENT/PLAN     Kana Choi is a 33 y.o. female who presents with:  Patient reporting fatigue headache chest tightness and nausea when she is exhaling starting 3 days ago she is also reporting nonproductive cough. On examination ears are normal bilaterally. Pharynx pink moist no tonsillar enlargement or exudate. Neck is supple no masses. Lung sounds clear throughout heart sounds normal regular. She has generalized abdominal tenderness that is mild with palpation. Rapid COVID and flu tests are negative, urine pregnancy negative      1. Other fatigue    2. Acute nonintractable headache, unspecified headache type    3. Viral illness      Orders for Zofran for nausea Bromfed syrup for sinus congestion and cough. Educated patient on expectation for viral illness to self resolve within 5 to 7 days of onset. She is return if she is not improving    PATIENT REFERRED TO:  Return if symptoms worsen or fail to improve. DISCHARGE MEDICATIONS:  New Prescriptions    ONDANSETRON (ZOFRAN-ODT) 4 MG DISINTEGRATING TABLET    Take 1 tablet by mouth 3 times daily as needed for Nausea or Vomiting    PSEUDOEPHEDRINE (DECONGESTANT) 30 MG TABLET    Take 1 tablet by mouth every 6 hours as needed for Congestion     Cannot display discharge medications since this is not an admission. Christine Alvarado, GIGI - YONNY    CHIEF COMPLAINT       Chief Complaint   Patient presents with    Chest Pain     X3 days pt states when exhaling is when feels pain dull/achy pain, pt has taken tylenol to relieve constant headache, pt also states has been having nausea and fatigue          SUBJECTIVE/REVIEW OF SYSTEMS     Review of Systems   Constitutional:  Positive for fatigue. Negative for chills and fever. HENT:  Positive for congestion. Negative for ear pain, hearing loss, rhinorrhea, sinus pressure and sore throat. Eyes: Negative.     Respiratory:  Positive for chest tightness. Negative for cough, shortness of breath and wheezing. Cardiovascular:  Negative for chest pain and palpitations. Gastrointestinal:  Positive for nausea. Negative for abdominal pain, diarrhea and vomiting. Endocrine: Negative. Musculoskeletal:  Negative for arthralgias and myalgias. Skin:  Negative for rash. Neurological:  Positive for headaches. Negative for dizziness, weakness and light-headedness. Hematological:  Negative for adenopathy. Psychiatric/Behavioral: Negative. OBJECTIVE/PHYSICAL EXAM     Physical Exam  Vitals reviewed. Constitutional:       Appearance: Normal appearance. She is not ill-appearing or toxic-appearing. HENT:      Head: Normocephalic. Right Ear: Tympanic membrane, ear canal and external ear normal. No middle ear effusion. There is no impacted cerumen. No mastoid tenderness. Tympanic membrane is not perforated, erythematous or bulging. Left Ear: Tympanic membrane, ear canal and external ear normal.  No middle ear effusion. There is no impacted cerumen. No mastoid tenderness. Tympanic membrane is not perforated, erythematous or bulging. Nose: No congestion or rhinorrhea. Mouth/Throat:      Mouth: Mucous membranes are moist.      Pharynx: Oropharynx is clear. No pharyngeal swelling, oropharyngeal exudate or posterior oropharyngeal erythema. Tonsils: No tonsillar exudate or tonsillar abscesses. 0 on the right. 0 on the left. Eyes:      General:         Right eye: No discharge. Left eye: No discharge. Cardiovascular:      Rate and Rhythm: Normal rate and regular rhythm. Pulses: Normal pulses. Heart sounds: Normal heart sounds. No murmur heard. No gallop. Pulmonary:      Effort: Pulmonary effort is normal. No respiratory distress. Breath sounds: Normal breath sounds. No stridor. No wheezing, rhonchi or rales. Chest:      Chest wall: Tenderness present. Abdominal:      General: Abdomen is flat. There is no distension. Palpations: There is no mass. Tenderness: There is abdominal tenderness. Musculoskeletal:         General: Normal range of motion. Cervical back: No rigidity or tenderness. Right lower leg: No edema. Left lower leg: No edema. Lymphadenopathy:      Head:      Right side of head: No submandibular adenopathy. Left side of head: No submandibular adenopathy. Cervical: No cervical adenopathy. Skin:     General: Skin is warm and dry. Capillary Refill: Capillary refill takes less than 2 seconds. Coloration: Skin is not pale. Neurological:      Mental Status: She is alert and oriented to person, place, and time. Mental status is at baseline. Psychiatric:         Mood and Affect: Mood normal.         Behavior: Behavior normal.       VITALS  BP: 114/72, Temp: 97.4 °F (36.3 °C), Temp Source: Temporal, Heart Rate: 89,  , SpO2: 98 %      PAST MEDICAL HISTORY         Diagnosis Date    PCOS (polycystic ovarian syndrome)     Shingles     Thyroid disease      SURGICAL HISTORY     Patient  has a past surgical history that includes laparoscopy. CURRENT MEDICATIONS       Previous Medications    DEXAMETHASONE (DECADRON) 1 MG TABLET    Take at 11:00 PM the night before your lab draw    LIRAGLUTIDE-WEIGHT MANAGEMENT (SAXENDA) 18 MG/3ML SOPN    Inject 0.6 mg daily for one week, increase by 0.6 mg weekly up to 3 mg    OMEPRAZOLE (PRILOSEC) 20 MG DELAYED RELEASE CAPSULE    Take 1 capsule by mouth Daily    SEGESTERONE-ETHINYL ESTRADIOL 0.15-0.013 MG/24HR RING    Insert 1 ring vaginally. Following insertion, ring should remain in place for 21 continuous days (3 weeks), then removed for 7 days (1 week). This pattern (3 weeks in and 1 week out) is one cycle; one ring provides contraception for 13 cycles (1 year).     SERTRALINE (ZOLOFT) 50 MG TABLET    Take 1 tablet by mouth daily    SPIRONOLACTONE (ALDACTONE) 50 MG TABLET    TAKE 1 TABLET BY MOUTH TWICE A DAY ALLERGIES     Patient is is allergic to codeine. FAMILY HISTORY     Patient'sfamily history includes Cancer in her mother; No Known Problems in her father; Stroke in her maternal aunt. HISTORY     Patient  reports that she has never smoked. She has never been exposed to tobacco smoke. She has never used smokeless tobacco. She reports current alcohol use. She reports that she does not use drugs. READY CARE COURSE     Orders Placed This Encounter   Procedures    POCT COVID-19, Antigen     Order Specific Question:   Is this test for diagnosis or screening? Answer:   Diagnosis of ill patient     Order Specific Question:   Symptomatic for COVID-19 as defined by CDC? Answer:   Yes     Order Specific Question:   Date of Symptom Onset     Answer:   11/14/2022     Order Specific Question:   Hospitalized for COVID-19? Answer:   No     Order Specific Question:   Admitted to ICU for COVID-19? Answer:   No     Order Specific Question:   Employed in healthcare setting? Answer:   No     Order Specific Question:   Resident in a congregate (group) care setting? Answer:   No     Order Specific Question:   Pregnant? Answer:   No     Order Specific Question:   Previously tested for COVID-19? Answer:   Yes    POCT Influenza A/B    POCT urine pregnancy        Labs:  Results for POC orders placed in visit on 11/17/22   POCT Influenza A/B   Result Value Ref Range    Influenza A Ab neg     Influenza B Ab neg    POCT urine pregnancy   Result Value Ref Range    Preg Test, Ur -     Control +      IMAGING:  No orders to display     Scheduled Meds:  Continuous Infusions:  PRN Meds:.

## 2022-11-17 NOTE — LETTER
[address]  Phone: [phone]  Fax: [phone]    GIGI Crabtree CNP        November 17, 2022     Patient: Tulio Briones   YOB: 1990   Date of Visit: 11/17/2022       To Whom It May Concern: It is my medical opinion that Vasiliy Delarosa may return to work on 11/21/2022. If you have any questions or concerns, please don't hesitate to call.     Sincerely,        GIGI Crabtree CNP

## 2022-11-25 ENCOUNTER — OFFICE VISIT (OUTPATIENT)
Dept: ENDOCRINOLOGY | Age: 32
End: 2022-11-25
Payer: COMMERCIAL

## 2022-11-25 VITALS
SYSTOLIC BLOOD PRESSURE: 118 MMHG | WEIGHT: 258 LBS | BODY MASS INDEX: 39.1 KG/M2 | HEIGHT: 68 IN | OXYGEN SATURATION: 96 % | DIASTOLIC BLOOD PRESSURE: 70 MMHG | HEART RATE: 56 BPM

## 2022-11-25 DIAGNOSIS — E28.2 PCOS (POLYCYSTIC OVARIAN SYNDROME): Primary | ICD-10-CM

## 2022-11-25 DIAGNOSIS — E24.9 HYPERCORTICISM (HCC): ICD-10-CM

## 2022-11-25 PROCEDURE — 99214 OFFICE O/P EST MOD 30 MIN: CPT | Performed by: PHYSICIAN ASSISTANT

## 2022-11-25 RX ORDER — SEMAGLUTIDE 1.34 MG/ML
0.25 INJECTION, SOLUTION SUBCUTANEOUS WEEKLY
Qty: 4 ADJUSTABLE DOSE PRE-FILLED PEN SYRINGE | Refills: 1 | Status: SHIPPED | OUTPATIENT
Start: 2022-11-25

## 2022-11-25 ASSESSMENT — ENCOUNTER SYMPTOMS
DIARRHEA: 0
WHEEZING: 0
ABDOMINAL PAIN: 0
RHINORRHEA: 0
COUGH: 0
SORE THROAT: 0
NAUSEA: 0
VOMITING: 0
SHORTNESS OF BREATH: 0
SINUS PRESSURE: 0

## 2022-11-25 NOTE — PROGRESS NOTES
11/25/2022    Assessment:       Diagnosis Orders   1. PCOS (polycystic ovarian syndrome)  Cortisol Total    Comprehensive Metabolic Panel    CBC    Hemoglobin A1C    Lipid Panel      2. Hypercorticism (HCC)  Cortisol Total    Comprehensive Metabolic Panel    CBC    Hemoglobin A1C    Lipid Panel          PLAN:     Continue spironolactone (Aldactone) 50 mg once a day   Start Ozempic 0.25 mg once a week for 4 weeks. Increase Ozempic 0.5 mg injected weekly after 1 month if no nausea or vomiting. Zofran 4 mg ODT as needed for nausea  Repeat morning 8:00 AM labs in 3 months    Follow up in 3 months       Orders Placed This Encounter   Procedures    Cortisol Total     Standing Status:   Future     Standing Expiration Date:   11/25/2023     Order Specific Question:   8AM or 4PM?     Answer:   8 am    Comprehensive Metabolic Panel     Standing Status:   Future     Standing Expiration Date:   11/25/2023    CBC     Standing Status:   Future     Standing Expiration Date:   11/25/2023    Hemoglobin A1C     Standing Status:   Future     Standing Expiration Date:   11/25/2023    Lipid Panel     Standing Status:   Future     Standing Expiration Date:   11/25/2023     Order Specific Question:   Is Patient Fasting?/# of Hours     Answer:   8         Orders Placed This Encounter   Medications    Semaglutide,0.25 or 0.5MG/DOS, (OZEMPIC, 0.25 OR 0.5 MG/DOSE,) 2 MG/1.5ML SOPN     Sig: Inject 0.25 mg into the skin once a week Inject 0.25 mg weekly for 4 weeks, then increase to 0.5 mg     Dispense:  4 Adjustable Dose Pre-filled Pen Syringe     Refill:  1         Return in about 3 months (around 2/25/2023) for weight management. Subjective:     No chief complaint on file.     Vitals:    11/25/22 1005   BP: 118/70   Site: Left Upper Arm   Pulse: 56   SpO2: 96%   Weight: 258 lb (117 kg)   Height: 5' 8\" (1.727 m)     Wt Readings from Last 3 Encounters:   11/25/22 258 lb (117 kg)   11/17/22 260 lb 3.2 oz (118 kg)   09/07/22 250 lb (113.4 kg) BP Readings from Last 3 Encounters:   11/25/22 118/70   11/17/22 114/72   09/07/22 100/67     Patient is a 71-year-old -American female who presents for evaluation of her PCOS today. She was diagnosed approximately 20 years ago, has never taken any medication for that. She has had history of dysmenorrhea with 2 to 3 months between very heavy menstrual cycles. This has improved on her current OCP NuvaRing. Thorough review of laboratories and previous records show no hyperglycemia, hyper or hypocalcemia A1c is normal.  Vital signs are normal.  She denies any hair skin or nail changes. She has however been experiencing intermittent night sweats 4-5 times a week that seem to be getting worse in the last year. Talked about the pathophysiology of PCOS the risk of diabetes due to insulin resistance and hyperlipidemia and hypercalcemia. We discussed the need for lifestyle changes including strict exercise regimen dietary restrictions. Prescribed GLP-1 for insulin resistance and weight loss.      Past Medical History:   Diagnosis Date    PCOS (polycystic ovarian syndrome)     Shingles     Thyroid disease      Past Surgical History:   Procedure Laterality Date    LAPAROSCOPY       Social History     Socioeconomic History    Marital status: Single     Spouse name: Not on file    Number of children: Not on file    Years of education: Not on file    Highest education level: Not on file   Occupational History    Not on file   Tobacco Use    Smoking status: Never     Passive exposure: Never    Smokeless tobacco: Never   Vaping Use    Vaping Use: Never used   Substance and Sexual Activity    Alcohol use: Yes     Comment: socially    Drug use: Never    Sexual activity: Yes     Partners: Male   Other Topics Concern    Not on file   Social History Narrative    Not on file     Social Determinants of Health     Financial Resource Strain: Low Risk     Difficulty of Paying Living Expenses: Not hard at all   Food Insecurity: No Food Insecurity    Worried About Running Out of Food in the Last Year: Never true    Ran Out of Food in the Last Year: Never true   Transportation Needs: Not on file   Physical Activity: Sufficiently Active    Days of Exercise per Week: 5 days    Minutes of Exercise per Session: 50 min   Stress: Not on file   Social Connections: Not on file   Intimate Partner Violence: Not At Risk    Fear of Current or Ex-Partner: No    Emotionally Abused: No    Physically Abused: No    Sexually Abused: No   Housing Stability: Not on file     Family History   Problem Relation Age of Onset    Cancer Mother         multiple myeloma    No Known Problems Father     Stroke Maternal Aunt      Allergies   Allergen Reactions    Codeine        Current Outpatient Medications:     Semaglutide,0.25 or 0.5MG/DOS, (OZEMPIC, 0.25 OR 0.5 MG/DOSE,) 2 MG/1.5ML SOPN, Inject 0.25 mg into the skin once a week Inject 0.25 mg weekly for 4 weeks, then increase to 0.5 mg, Disp: 4 Adjustable Dose Pre-filled Pen Syringe, Rfl: 1    Segesterone-Ethinyl Estradiol 0.15-0.013 MG/24HR RING, Insert 1 ring vaginally. Following insertion, ring should remain in place for 21 continuous days (3 weeks), then removed for 7 days (1 week). This pattern (3 weeks in and 1 week out) is one cycle; one ring provides contraception for 13 cycles (1 year). , Disp: 3 each, Rfl: 1    sertraline (ZOLOFT) 50 MG tablet, Take 1 tablet by mouth daily, Disp: 90 tablet, Rfl: 1    spironolactone (ALDACTONE) 50 MG tablet, TAKE 1 TABLET BY MOUTH TWICE A DAY, Disp: 180 tablet, Rfl: 1    liraglutide-weight management (SAXENDA) 18 MG/3ML SOPN, Inject 0.6 mg daily for one week, increase by 0.6 mg weekly up to 3 mg (Patient not taking: No sig reported), Disp: 3 pen, Rfl: 3  Lab Results   Component Value Date     09/24/2022    K 4.0 09/24/2022     09/24/2022    CO2 22 09/24/2022    BUN 13 09/24/2022    CREATININE 0.74 09/24/2022    GLUCOSE 98 09/24/2022    CALCIUM 9.6 09/24/2022    PROT 7.5 09/24/2022    LABALBU 4.0 09/24/2022    BILITOT <0.2 09/24/2022    ALKPHOS 49 09/24/2022    AST 15 09/24/2022    ALT 15 09/24/2022    LABGLOM >60.0 09/24/2022    GFRAA >60.0 09/24/2022    GLOB 3.5 09/24/2022     Lab Results   Component Value Date    WBC 5.7 06/26/2021    HGB 13.1 06/26/2021    HCT 39.6 06/26/2021    MCV 83.4 06/26/2021     06/26/2021     Lab Results   Component Value Date    LABA1C 5.6 09/24/2022    LABA1C 5.3 08/06/2022    LABA1C 5.4 06/26/2021      Latest Reference Range & Units 8/6/22 11:27 8/6/22 11:28 9/24/22 08:38   CORTISOL 2.7 - 18.4 ug/dL 28.4 (H)  2.8   Cortisol Collection Info  UNK  UNK   ACTH 7.2 - 63.3 pg/mL  76.0 (H)    (H): Data is abnormally high     Ref. Range 6/26/2021 10:36   DHEA (Dehydroepiandrosterone) Latest Ref Range: 1.330 - 7.780 ng/mL 6. 113       Results for Beti Ohara (MRN [id number]) as of 7/16/2021 12:28   Ref. Range 6/26/2021 10:36   Sex Hormone Binding Latest Ref Range: 30 - 135 nmol/L 171 (H)   Testosterone, Free Latest Ref Range: 0.8 - 7.4 pg/mL 3.3   Testosterone, Females/Children Latest Ref Range: 9 - 55 ng/dL 66 (H)   Results for Beti Ohara (MRN [id number]) as of 7/16/2021 12:28   Ref. Range 6/26/2021 10:36   Insulin Latest Ref Range: 2.6 - 24.9 uIU/mL 11.2   Results for Beti Ohara (MRN [id number]) as of 7/16/2021 12:28   Ref. Range 6/26/2021 10:36   Prolactin Latest Units: ng/mL 20.0      Latest Reference Range & Units 8/6/22 11:27   Insulin, Free 3 - 25 uIU/mL 9   INSULIN,INS 3 - 25 uIU/mL 13     Lab Results   Component Value Date    HDL 66 (H) 06/26/2021    LDLCALC 98 06/26/2021    CHOL 183 06/26/2021    TRIG 95 06/26/2021     No results found for: TESTM  Lab Results   Component Value Date    TSH 1.080 06/26/2021    T4FREE 1.36 06/26/2021     Lab Results   Component Value Date    TPOABS <4.0 06/26/2021       Review of Systems   Constitutional:  Negative for chills, fatigue and fever.    HENT:  Negative for congestion, ear pain, postnasal drip, rhinorrhea, sinus pressure and sore throat. Eyes:  Negative for visual disturbance. Respiratory:  Negative for cough, shortness of breath and wheezing. Cardiovascular:  Negative for chest pain, palpitations and leg swelling. Gastrointestinal:  Negative for abdominal pain, diarrhea, nausea and vomiting. Endocrine: Negative for cold intolerance, heat intolerance, polydipsia and polyuria. Patient denies history of pancreatitis, alcohol abuse, family or personal history of thyroid cancer, MEN 2, MTC. Genitourinary:  Negative for difficulty urinating. Musculoskeletal:  Negative for arthralgias. Skin:  Negative for rash. Allergic/Immunologic: Negative for environmental allergies. Neurological:  Negative for dizziness and headaches. Hematological:  Does not bruise/bleed easily. Psychiatric/Behavioral:  Negative for dysphoric mood. Objective:   Physical Exam  Constitutional:       Appearance: Normal appearance. She is well-developed. She is not ill-appearing. HENT:      Head: Normocephalic and atraumatic. Nose: No congestion. Eyes:      Conjunctiva/sclera: Conjunctivae normal.   Neck:      Comments: No thyromegaly or palpable nodules, acanthosis and cervical fat pad   Cardiovascular:      Rate and Rhythm: Normal rate and regular rhythm. Heart sounds: Normal heart sounds. Pulmonary:      Effort: Pulmonary effort is normal.      Breath sounds: Normal breath sounds. Abdominal:      General: Bowel sounds are normal.      Palpations: Abdomen is soft. Musculoskeletal:         General: Normal range of motion. Cervical back: Normal range of motion and neck supple. Skin:     General: Skin is warm and dry. Neurological:      General: No focal deficit present. Mental Status: She is alert and oriented to person, place, and time.    Psychiatric:         Mood and Affect: Mood normal.

## 2022-11-25 NOTE — PATIENT INSTRUCTIONS
Continue spironolactone (Aldactone) 50 mg once a day   Start Ozempic 0.25 mg once a week for 4 weeks. Increase Ozempic 0.5 mg injected weekly after 1 month if no nausea or vomiting.    Zofran 4 mg ODT as needed for nausea  Repeat morning 8:00 AM labs in 3 months    Follow up in 3 months

## 2022-12-01 RX ORDER — FAMOTIDINE 20 MG/1
20 TABLET, FILM COATED ORAL 2 TIMES DAILY
Qty: 180 TABLET | Refills: 1 | Status: SHIPPED | OUTPATIENT
Start: 2022-12-01

## 2022-12-01 RX ORDER — ONDANSETRON 4 MG/1
4 TABLET, ORALLY DISINTEGRATING ORAL 3 TIMES DAILY PRN
Qty: 21 TABLET | Refills: 0 | Status: SHIPPED | OUTPATIENT
Start: 2022-12-01

## 2022-12-09 ENCOUNTER — OFFICE VISIT (OUTPATIENT)
Dept: FAMILY MEDICINE CLINIC | Age: 32
End: 2022-12-09
Payer: COMMERCIAL

## 2022-12-09 VITALS
HEART RATE: 78 BPM | BODY MASS INDEX: 38.8 KG/M2 | TEMPERATURE: 97.1 F | OXYGEN SATURATION: 98 % | WEIGHT: 256 LBS | DIASTOLIC BLOOD PRESSURE: 62 MMHG | SYSTOLIC BLOOD PRESSURE: 126 MMHG | HEIGHT: 68 IN

## 2022-12-09 DIAGNOSIS — E24.9 HYPERCORTICISM (HCC): Primary | ICD-10-CM

## 2022-12-09 DIAGNOSIS — E28.2 PCOS (POLYCYSTIC OVARIAN SYNDROME): ICD-10-CM

## 2022-12-09 DIAGNOSIS — F32.A ANXIETY AND DEPRESSION: ICD-10-CM

## 2022-12-09 DIAGNOSIS — F41.9 ANXIETY AND DEPRESSION: ICD-10-CM

## 2022-12-09 DIAGNOSIS — R11.2 NAUSEA AND VOMITING, UNSPECIFIED VOMITING TYPE: ICD-10-CM

## 2022-12-09 DIAGNOSIS — R61 HYPERHIDROSIS: ICD-10-CM

## 2022-12-09 PROCEDURE — 99214 OFFICE O/P EST MOD 30 MIN: CPT | Performed by: FAMILY MEDICINE

## 2022-12-09 RX ORDER — SERTRALINE HYDROCHLORIDE 100 MG/1
100 TABLET, FILM COATED ORAL DAILY
Qty: 90 TABLET | Refills: 1 | Status: SHIPPED | OUTPATIENT
Start: 2022-12-09

## 2022-12-09 RX ORDER — PROMETHAZINE HYDROCHLORIDE 12.5 MG/1
12.5 TABLET ORAL DAILY PRN
Qty: 90 TABLET | Refills: 0 | Status: SHIPPED | OUTPATIENT
Start: 2022-12-09 | End: 2023-01-08

## 2022-12-09 NOTE — PROGRESS NOTES
Chief Complaint   Patient presents with    6 Month Follow-Up    Anxiety     Pt would like her zoloft dose increased. Other     Pt reports waking up in night sweats, unsure why. HPI: Reina Mills 33 y.o. female presenting for     Patient is on pill and nuvaring   Reports that the nuvaring caused indegestion and was not complaint with the pills   Denies nay history of migraines   Denies any fever, chills, nausea, vomiting chest pain, shortenss of breath, abomdinal pain     F/u   Stable     Night sweats   Once in a while   Reports that it happens once in a while. PCOS   On the spironolactone   Will help with the hair       Depression and anxiety   zoloft 50 mg daily   Stable     F/u   Feels like her depression and anxiety is not controlled   Would like to increase the dose of zoloft. Current Outpatient Medications   Medication Sig Dispense Refill    Omeprazole Magnesium (PRILOSEC PO) Take by mouth      ondansetron (ZOFRAN-ODT) 4 MG disintegrating tablet Take 1 tablet by mouth 3 times daily as needed for Nausea or Vomiting 21 tablet 0    Semaglutide,0.25 or 0.5MG/DOS, (OZEMPIC, 0.25 OR 0.5 MG/DOSE,) 2 MG/1.5ML SOPN Inject 0.25 mg into the skin once a week Inject 0.25 mg weekly for 4 weeks, then increase to 0.5 mg 4 Adjustable Dose Pre-filled Pen Syringe 1    Segesterone-Ethinyl Estradiol 0.15-0.013 MG/24HR RING Insert 1 ring vaginally. Following insertion, ring should remain in place for 21 continuous days (3 weeks), then removed for 7 days (1 week). This pattern (3 weeks in and 1 week out) is one cycle; one ring provides contraception for 13 cycles (1 year).  3 each 1    sertraline (ZOLOFT) 50 MG tablet Take 1 tablet by mouth daily 90 tablet 1    spironolactone (ALDACTONE) 50 MG tablet TAKE 1 TABLET BY MOUTH TWICE A  tablet 1    famotidine (PEPCID) 20 MG tablet Take 1 tablet by mouth 2 times daily (Patient not taking: Reported on 12/9/2022) 180 tablet 1    liraglutide-weight management (SAXENDA) 18 MG/3ML SOPN Inject 0.6 mg daily for one week, increase by 0.6 mg weekly up to 3 mg (Patient not taking: No sig reported) 3 pen 3     No current facility-administered medications for this visit. ROS  CONSTITUTIONAL: The patient denies fevers, chills, sweats and body ache. HEENT: Denies headache, blurry vision, eye pain, tinnitus, vertigo,  sore throat, neck or thyroid masses. RESPIRATORY: Denies cough, sputum, hemoptysis. CARDIAC: Denies chest pain, pressure, palpitations, Denies lower extremity edema. GASTROINTESTINAL: Denies abdominal pain, constipation, diarrhea, bleeding in the stools,   GENITOURINARY: Denies dysuria, hematuria, nocturia or frequency, urinary incontinence. NEUROLOGIC: Denies headaches, dizziness, syncope, weakness  MUSCULOSKELETAL: denies changes in range of motion, joint pain, stiffness. ENDOCRINOLOGY: Denies heat or cold intolerance. HEMATOLOGY: Denies easy bleeding or blood transfusion,anemia  DERMATOLOGY: Denies changes in moles or pigmentation changes.   PSYCHIATRY: admits depression and anxiety     Past Medical History:   Diagnosis Date    PCOS (polycystic ovarian syndrome)     Shingles     Thyroid disease         Past Surgical History:   Procedure Laterality Date    LAPAROSCOPY          Family History   Problem Relation Age of Onset    Cancer Mother         multiple myeloma    No Known Problems Father     Stroke Maternal Aunt         Social History     Socioeconomic History    Marital status: Single     Spouse name: Not on file    Number of children: Not on file    Years of education: Not on file    Highest education level: Not on file   Occupational History    Not on file   Tobacco Use    Smoking status: Never     Passive exposure: Never    Smokeless tobacco: Never   Vaping Use    Vaping Use: Never used   Substance and Sexual Activity    Alcohol use: Yes     Comment: socially    Drug use: Never    Sexual activity: Yes     Partners: Male   Other Topics Concern Not on file   Social History Narrative    Not on file     Social Determinants of Health     Financial Resource Strain: Low Risk     Difficulty of Paying Living Expenses: Not hard at all   Food Insecurity: No Food Insecurity    Worried About Running Out of Food in the Last Year: Never true    Ran Out of Food in the Last Year: Never true   Transportation Needs: Not on file   Physical Activity: Sufficiently Active    Days of Exercise per Week: 5 days    Minutes of Exercise per Session: 50 min   Stress: Not on file   Social Connections: Not on file   Intimate Partner Violence: Not At Risk    Fear of Current or Ex-Partner: No    Emotionally Abused: No    Physically Abused: No    Sexually Abused: No   Housing Stability: Not on file        Pulse 78   Temp 97.1 °F (36.2 °C) (Temporal)   Ht 5' 8\" (1.727 m)   Wt 256 lb (116.1 kg)   SpO2 98%   BMI 38.92 kg/m²        Physical Exam:    General appearance - alert, well appearing, and in no distress  Mental Status - alert, oriented to person, place, and time  Eyes - pupils equal and reactive, extraocular eye movements intact   Ears - bilateral TM's and external ear canals normal   Nose - normal and patent, no erythema, discharge or polyps   Sinuses - Normal paranasal sinuses without tenderness   Throat - mucous membranes moist, pharynx normal without lesions   Neck - supple, no significant adenopathy   Thyroid - thyroid is normal in size without nodules or tenderness    Chest - clear to auscultation, no wheezes, rales or rhonchi, symmetric air entry   Heart - normal rate, regular rhythm, normal S1, S2, no murmurs, rubs, clicks or gallops  Abdomen - soft, nontender, nondistended, no masses or organomegaly   Back exam - full range of motion, no tenderness, palpable spasm or pain on motion   Neurological - alert, oriented, normal speech, no focal findings or movement disorder noted   Musculoskeletal - no joint tenderness, deformity or swelling.  Admits to intermittent rectal spasms  Extremities - peripheral pulses normal, no pedal edema, no clubbing or cyanosis   Skin - normal coloration and turgor, no rashes, no suspicious skin lesions noted    Labs   No results found for: TSHREFLEX  TSH   Date Value Ref Range Status   06/26/2021 1.080 0.440 - 3.860 uIU/mL Final     Lab Results   Component Value Date     09/24/2022    K 4.0 09/24/2022     09/24/2022    CO2 22 09/24/2022    BUN 13 09/24/2022    CREATININE 0.74 09/24/2022    GLUCOSE 98 09/24/2022    CALCIUM 9.6 09/24/2022    PROT 7.5 09/24/2022    LABALBU 4.0 09/24/2022    BILITOT <0.2 09/24/2022    ALKPHOS 49 09/24/2022    AST 15 09/24/2022    ALT 15 09/24/2022    LABGLOM >60.0 09/24/2022    GFRAA >60.0 09/24/2022    GLOB 3.5 09/24/2022       Lab Results   Component Value Date    LABA1C 5.6 09/24/2022     No results found for: EAG    Lab Results   Component Value Date    WBC 5.7 06/26/2021    HGB 13.1 06/26/2021    HCT 39.6 06/26/2021    MCV 83.4 06/26/2021     06/26/2021           A/P: Atif Bohr 33 y.o. female presenting for    1. Hypercorticism (HCC)    - promethazine (PHENERGAN) 12.5 MG tablet; Take 1 tablet by mouth daily as needed for Nausea  Dispense: 90 tablet; Refill: 0    2. PCOS (polycystic ovarian syndrome)    - promethazine (PHENERGAN) 12.5 MG tablet; Take 1 tablet by mouth daily as needed for Nausea  Dispense: 90 tablet; Refill: 0    3. Anxiety and depression    - sertraline (ZOLOFT) 100 MG tablet; Take 1 tablet by mouth daily  Dispense: 90 tablet; Refill: 1    4. Nausea and vomiting, unspecified vomiting type    - promethazine (PHENERGAN) 12.5 MG tablet; Take 1 tablet by mouth daily as needed for Nausea  Dispense: 90 tablet; Refill: 0    5. Hyperhidrosis    - aluminum chloride (DRYSOL) 20 % external solution; Apply once daily at bedtime; once excessive sweating has stopped, may decrease to once or twice weekly, or as needed. Wash treated area in the morning.   Dispense: 60 mL; Refill: 1        Please note, this report has been partially produced using speech recognition software  and may cause  and /or contain errors related to that system including grammar, punctuation and spelling as well as words and phrases that may seem inappropriate. If there are questions or concerns please feel free to contact me to clarify.

## 2023-01-19 RX ORDER — SEMAGLUTIDE 1.34 MG/ML
INJECTION, SOLUTION SUBCUTANEOUS
Qty: 3 ML | Refills: 3 | Status: SHIPPED | OUTPATIENT
Start: 2023-01-19

## 2023-03-10 ENCOUNTER — OFFICE VISIT (OUTPATIENT)
Dept: FAMILY MEDICINE CLINIC | Age: 33
End: 2023-03-10

## 2023-03-10 VITALS
HEART RATE: 67 BPM | OXYGEN SATURATION: 98 % | TEMPERATURE: 97.3 F | WEIGHT: 259 LBS | BODY MASS INDEX: 39.25 KG/M2 | HEIGHT: 68 IN | DIASTOLIC BLOOD PRESSURE: 70 MMHG | SYSTOLIC BLOOD PRESSURE: 110 MMHG

## 2023-03-10 DIAGNOSIS — E28.2 PCOS (POLYCYSTIC OVARIAN SYNDROME): ICD-10-CM

## 2023-03-10 DIAGNOSIS — E24.9 HYPERCORTICISM (HCC): ICD-10-CM

## 2023-03-10 DIAGNOSIS — B96.89 ACUTE BACTERIAL SINUSITIS: Primary | ICD-10-CM

## 2023-03-10 DIAGNOSIS — J01.90 ACUTE BACTERIAL SINUSITIS: Primary | ICD-10-CM

## 2023-03-10 DIAGNOSIS — F41.9 ANXIETY AND DEPRESSION: ICD-10-CM

## 2023-03-10 DIAGNOSIS — F32.A ANXIETY AND DEPRESSION: ICD-10-CM

## 2023-03-10 RX ORDER — FAMOTIDINE 20 MG/1
TABLET, FILM COATED ORAL
COMMUNITY
Start: 2023-02-21

## 2023-03-10 RX ORDER — AMOXICILLIN AND CLAVULANATE POTASSIUM 875; 125 MG/1; MG/1
1 TABLET, FILM COATED ORAL 2 TIMES DAILY
Qty: 20 TABLET | Refills: 0 | Status: SHIPPED | OUTPATIENT
Start: 2023-03-10 | End: 2023-03-20

## 2023-03-10 SDOH — ECONOMIC STABILITY: FOOD INSECURITY: WITHIN THE PAST 12 MONTHS, THE FOOD YOU BOUGHT JUST DIDN'T LAST AND YOU DIDN'T HAVE MONEY TO GET MORE.: NEVER TRUE

## 2023-03-10 SDOH — ECONOMIC STABILITY: INCOME INSECURITY: HOW HARD IS IT FOR YOU TO PAY FOR THE VERY BASICS LIKE FOOD, HOUSING, MEDICAL CARE, AND HEATING?: SOMEWHAT HARD

## 2023-03-10 SDOH — ECONOMIC STABILITY: TRANSPORTATION INSECURITY
IN THE PAST 12 MONTHS, HAS LACK OF TRANSPORTATION KEPT YOU FROM MEETINGS, WORK, OR FROM GETTING THINGS NEEDED FOR DAILY LIVING?: NO

## 2023-03-10 SDOH — ECONOMIC STABILITY: HOUSING INSECURITY
IN THE LAST 12 MONTHS, WAS THERE A TIME WHEN YOU DID NOT HAVE A STEADY PLACE TO SLEEP OR SLEPT IN A SHELTER (INCLUDING NOW)?: NO

## 2023-03-10 SDOH — ECONOMIC STABILITY: FOOD INSECURITY: WITHIN THE PAST 12 MONTHS, YOU WORRIED THAT YOUR FOOD WOULD RUN OUT BEFORE YOU GOT MONEY TO BUY MORE.: NEVER TRUE

## 2023-03-10 ASSESSMENT — PATIENT HEALTH QUESTIONNAIRE - PHQ9
SUM OF ALL RESPONSES TO PHQ QUESTIONS 1-9: 0
5. POOR APPETITE OR OVEREATING: 0
SUM OF ALL RESPONSES TO PHQ QUESTIONS 1-9: 0
SUM OF ALL RESPONSES TO PHQ9 QUESTIONS 1 & 2: 0
3. TROUBLE FALLING OR STAYING ASLEEP: 0
2. FEELING DOWN, DEPRESSED OR HOPELESS: 0
SUM OF ALL RESPONSES TO PHQ QUESTIONS 1-9: 0
10. IF YOU CHECKED OFF ANY PROBLEMS, HOW DIFFICULT HAVE THESE PROBLEMS MADE IT FOR YOU TO DO YOUR WORK, TAKE CARE OF THINGS AT HOME, OR GET ALONG WITH OTHER PEOPLE: 0
9. THOUGHTS THAT YOU WOULD BE BETTER OFF DEAD, OR OF HURTING YOURSELF: 0
4. FEELING TIRED OR HAVING LITTLE ENERGY: 0
1. LITTLE INTEREST OR PLEASURE IN DOING THINGS: 0
6. FEELING BAD ABOUT YOURSELF - OR THAT YOU ARE A FAILURE OR HAVE LET YOURSELF OR YOUR FAMILY DOWN: 0
SUM OF ALL RESPONSES TO PHQ QUESTIONS 1-9: 0
8. MOVING OR SPEAKING SO SLOWLY THAT OTHER PEOPLE COULD HAVE NOTICED. OR THE OPPOSITE, BEING SO FIGETY OR RESTLESS THAT YOU HAVE BEEN MOVING AROUND A LOT MORE THAN USUAL: 0
7. TROUBLE CONCENTRATING ON THINGS, SUCH AS READING THE NEWSPAPER OR WATCHING TELEVISION: 0

## 2023-03-10 NOTE — PROGRESS NOTES
Chief Complaint   Patient presents with    3 Month Follow-Up    Anxiety    Varicose Veins     Patient wants to know treatments for varicose veins. Colon Cancer Screening     Patient has family hx of colon cancer/issues. Wants to know what precautions should she take. HPI: Woo Gandhi 29 y.o. female presenting for     Varicose veins   Back of the left knee   Has not tried any measures     Family history of colon cancer   Mainly aunts   No 1st degree relatives with colon cancer     Sinus drainage   Reports that it is resolving   Unsure if it is a sinuitis   Going on for 1 week. Patient is on pill and nuvaring   Reports that the nuvaring caused indegestion and was not complaint with the pills   Denies nay history of migraines   Denies any fever, chills, nausea, vomiting chest pain, shortenss of breath, abomdinal pain     F/u   Stable     Night sweats   Once in a while   Reports that it happens once in a while. PCOS   On the spironolactone   Will help with the hair       Depression and anxiety   zoloft 50 mg daily   Stable     F/u   Feels like her depression and anxiety is not controlled   Would like to increase the dose of zoloft. Current Outpatient Medications   Medication Sig Dispense Refill    amoxicillin-clavulanate (AUGMENTIN) 875-125 MG per tablet Take 1 tablet by mouth 2 times daily for 10 days 20 tablet 0    Semaglutide, 1 MG/DOSE, (OZEMPIC, 1 MG/DOSE,) 4 MG/3ML SOPN 1 mg once a week 3 mL 3    Omeprazole Magnesium (PRILOSEC PO) Take by mouth      sertraline (ZOLOFT) 100 MG tablet Take 1 tablet by mouth daily 90 tablet 1    aluminum chloride (DRYSOL) 20 % external solution Apply once daily at bedtime; once excessive sweating has stopped, may decrease to once or twice weekly, or as needed. Wash treated area in the morning.  60 mL 1    Semaglutide,0.25 or 0.5MG/DOS, (OZEMPIC, 0.25 OR 0.5 MG/DOSE,) 2 MG/1.5ML SOPN Inject 0.25 mg into the skin once a week Inject 0.25 mg weekly for 4 weeks, then increase to 0.5 mg 4 Adjustable Dose Pre-filled Pen Syringe 1    Segesterone-Ethinyl Estradiol 0.15-0.013 MG/24HR RING Insert 1 ring vaginally. Following insertion, ring should remain in place for 21 continuous days (3 weeks), then removed for 7 days (1 week). This pattern (3 weeks in and 1 week out) is one cycle; one ring provides contraception for 13 cycles (1 year). 3 each 1    spironolactone (ALDACTONE) 50 MG tablet TAKE 1 TABLET BY MOUTH TWICE A  tablet 1    famotidine (PEPCID) 20 MG tablet TAKE 1 TABLET BY MOUTH TWICE A DAY       No current facility-administered medications for this visit. ROS  CONSTITUTIONAL: The patient denies fevers, chills, sweats and body ache. HEENT: Denies headache, blurry vision, eye pain, tinnitus, vertigo,  sore throat, neck or thyroid masses. RESPIRATORY: Denies cough, sputum, hemoptysis. CARDIAC: Denies chest pain, pressure, palpitations, Denies lower extremity edema. GASTROINTESTINAL: Denies abdominal pain, constipation, diarrhea, bleeding in the stools,   GENITOURINARY: Denies dysuria, hematuria, nocturia or frequency, urinary incontinence. NEUROLOGIC: Denies headaches, dizziness, syncope, weakness  MUSCULOSKELETAL: denies changes in range of motion, joint pain, stiffness. ENDOCRINOLOGY: Denies heat or cold intolerance. HEMATOLOGY: Denies easy bleeding or blood transfusion,anemia  DERMATOLOGY: Denies changes in moles or pigmentation changes.   PSYCHIATRY: admits depression and anxiety     Past Medical History:   Diagnosis Date    PCOS (polycystic ovarian syndrome)     Shingles     Thyroid disease         Past Surgical History:   Procedure Laterality Date    LAPAROSCOPY          Family History   Problem Relation Age of Onset    Cancer Mother         multiple myeloma    No Known Problems Father     Stroke Maternal Aunt         Social History     Socioeconomic History    Marital status: Single     Spouse name: Not on file    Number of children: Not on file    Years of education: Not on file    Highest education level: Not on file   Occupational History    Not on file   Tobacco Use    Smoking status: Never     Passive exposure: Never    Smokeless tobacco: Never   Vaping Use    Vaping Use: Never used   Substance and Sexual Activity    Alcohol use: Yes     Comment: socially    Drug use: Never    Sexual activity: Yes     Partners: Male   Other Topics Concern    Not on file   Social History Narrative    Not on file     Social Determinants of Health     Financial Resource Strain: Medium Risk    Difficulty of Paying Living Expenses: Somewhat hard   Food Insecurity: No Food Insecurity    Worried About Running Out of Food in the Last Year: Never true    Ran Out of Food in the Last Year: Never true   Transportation Needs: Unknown    Lack of Transportation (Medical): Not on file    Lack of Transportation (Non-Medical):  No   Physical Activity: Sufficiently Active    Days of Exercise per Week: 5 days    Minutes of Exercise per Session: 50 min   Stress: Not on file   Social Connections: Not on file   Intimate Partner Violence: Not At Risk    Fear of Current or Ex-Partner: No    Emotionally Abused: No    Physically Abused: No    Sexually Abused: No   Housing Stability: Unknown    Unable to Pay for Housing in the Last Year: Not on file    Number of Places Lived in the Last Year: Not on file    Unstable Housing in the Last Year: No        /70   Pulse 67   Temp 97.3 °F (36.3 °C) (Temporal)   Ht 5' 8\" (1.727 m)   Wt 259 lb (117.5 kg)   SpO2 98%   BMI 39.38 kg/m²        Physical Exam:    General appearance - alert, well appearing, and in no distress  Mental Status - alert, oriented to person, place, and time  Eyes - pupils equal and reactive, extraocular eye movements intact   Ears - bilateral TM's and external ear canals normal   Nose - normal and patent, no erythema, discharge or polyps   Sinuses - with tenderness in the sinsues   Throat - mucous membranes moist, pharynx normal without lesions   Neck - supple, no significant adenopathy   Thyroid - thyroid is normal in size without nodules or tenderness    Chest - clear to auscultation, no wheezes, rales or rhonchi, symmetric air entry   Heart - normal rate, regular rhythm, normal S1, S2, no murmurs, rubs, clicks or gallops  Abdomen - soft, nontender, nondistended, no masses or organomegaly   Back exam - full range of motion, no tenderness, palpable spasm or pain on motion   Neurological - alert, oriented, normal speech, no focal findings or movement disorder noted   Musculoskeletal - no joint tenderness, deformity or swelling. Admits to intermittent rectal spasms  Extremities - peripheral pulses normal, no pedal edema, no clubbing or cyanosis   Skin - normal coloration and turgor, no rashes, no suspicious skin lesions noted    Labs   No results found for: TSHREFLEX  TSH   Date Value Ref Range Status   06/26/2021 1.080 0.440 - 3.860 uIU/mL Final     Lab Results   Component Value Date     09/24/2022    K 4.0 09/24/2022     09/24/2022    CO2 22 09/24/2022    BUN 13 09/24/2022    CREATININE 0.74 09/24/2022    GLUCOSE 98 09/24/2022    CALCIUM 9.6 09/24/2022    PROT 7.5 09/24/2022    LABALBU 4.0 09/24/2022    BILITOT <0.2 09/24/2022    ALKPHOS 49 09/24/2022    AST 15 09/24/2022    ALT 15 09/24/2022    LABGLOM >60.0 09/24/2022    GFRAA >60.0 09/24/2022    GLOB 3.5 09/24/2022       Lab Results   Component Value Date    LABA1C 5.6 09/24/2022     No results found for: EAG    Lab Results   Component Value Date    WBC 5.7 06/26/2021    HGB 13.1 06/26/2021    HCT 39.6 06/26/2021    MCV 83.4 06/26/2021     06/26/2021           A/P: Davida Hussein 29 y.o. female presenting for      1. Acute bacterial sinusitis    - amoxicillin-clavulanate (AUGMENTIN) 875-125 MG per tablet; Take 1 tablet by mouth 2 times daily for 10 days  Dispense: 20 tablet; Refill: 0    2. Hypercorticism ([address]  Stable     3.  PCOS (polycystic ovarian syndrome)      4. Anxiety and depression  Denies any SI or HI        Please note, this report has been partially produced using speech recognition software  and may cause  and /or contain errors related to that system including grammar, punctuation and spelling as well as words and phrases that may seem inappropriate. If there are questions or concerns please feel free to contact me to clarify.

## 2023-04-21 ENCOUNTER — OFFICE VISIT (OUTPATIENT)
Dept: ENDOCRINOLOGY | Age: 33
End: 2023-04-21
Payer: COMMERCIAL

## 2023-04-21 VITALS
WEIGHT: 259 LBS | SYSTOLIC BLOOD PRESSURE: 98 MMHG | BODY MASS INDEX: 40.65 KG/M2 | DIASTOLIC BLOOD PRESSURE: 68 MMHG | HEIGHT: 67 IN

## 2023-04-21 DIAGNOSIS — E28.2 PCOS (POLYCYSTIC OVARIAN SYNDROME): Primary | ICD-10-CM

## 2023-04-21 PROCEDURE — 99214 OFFICE O/P EST MOD 30 MIN: CPT | Performed by: PHYSICIAN ASSISTANT

## 2023-04-21 RX ORDER — SEMAGLUTIDE 2.68 MG/ML
2 INJECTION, SOLUTION SUBCUTANEOUS WEEKLY
Qty: 3 ML | Refills: 5 | Status: SHIPPED | OUTPATIENT
Start: 2023-04-21

## 2023-04-21 RX ORDER — SEMAGLUTIDE 1.34 MG/ML
INJECTION, SOLUTION SUBCUTANEOUS
Qty: 3 ML | Refills: 3 | Status: CANCELLED | OUTPATIENT
Start: 2023-04-21

## 2023-04-21 ASSESSMENT — ENCOUNTER SYMPTOMS
VOMITING: 0
SORE THROAT: 0
NAUSEA: 0
COUGH: 0
SINUS PRESSURE: 0
WHEEZING: 0
RHINORRHEA: 0
ABDOMINAL PAIN: 0
SHORTNESS OF BREATH: 0
DIARRHEA: 0

## 2023-04-21 NOTE — PROGRESS NOTES
resistance and weight loss. Past Medical History:   Diagnosis Date    PCOS (polycystic ovarian syndrome)     Shingles     Thyroid disease      Past Surgical History:   Procedure Laterality Date    LAPAROSCOPY       Social History     Socioeconomic History    Marital status: Single     Spouse name: Not on file    Number of children: Not on file    Years of education: Not on file    Highest education level: Not on file   Occupational History    Not on file   Tobacco Use    Smoking status: Never     Passive exposure: Never    Smokeless tobacco: Never   Vaping Use    Vaping Use: Never used   Substance and Sexual Activity    Alcohol use: Yes     Comment: socially    Drug use: Never    Sexual activity: Yes     Partners: Male   Other Topics Concern    Not on file   Social History Narrative    Not on file     Social Determinants of Health     Financial Resource Strain: Medium Risk    Difficulty of Paying Living Expenses: Somewhat hard   Food Insecurity: No Food Insecurity    Worried About Running Out of Food in the Last Year: Never true    Ran Out of Food in the Last Year: Never true   Transportation Needs: Unknown    Lack of Transportation (Medical): Not on file    Lack of Transportation (Non-Medical):  No   Physical Activity: Sufficiently Active    Days of Exercise per Week: 5 days    Minutes of Exercise per Session: 50 min   Stress: Not on file   Social Connections: Not on file   Intimate Partner Violence: Not At Risk    Fear of Current or Ex-Partner: No    Emotionally Abused: No    Physically Abused: No    Sexually Abused: No   Housing Stability: Unknown    Unable to Pay for Housing in the Last Year: Not on file    Number of Places Lived in the Last Year: Not on file    Unstable Housing in the Last Year: No     Family History   Problem Relation Age of Onset    Cancer Mother         multiple myeloma    No Known Problems Father     Stroke Maternal Aunt      Allergies   Allergen Reactions    Codeine        Current

## 2023-04-21 NOTE — PATIENT INSTRUCTIONS
Continue spironolactone (Aldactone) 50 mg once a day   Increase Ozempic 2.0 mg once a week   Zofran 4 mg ODT as needed for nausea  Repeat morning 8:00 AM labs in 3 months    Follow up in 3 months

## 2023-05-01 ENCOUNTER — TELEPHONE (OUTPATIENT)
Dept: ENDOCRINOLOGY | Age: 33
End: 2023-05-01

## 2023-10-04 ENCOUNTER — TELEPHONE (OUTPATIENT)
Dept: FAMILY MEDICINE CLINIC | Age: 33
End: 2023-10-04

## 2023-10-04 DIAGNOSIS — E28.2 PCOS (POLYCYSTIC OVARIAN SYNDROME): Primary | ICD-10-CM

## 2023-10-04 NOTE — TELEPHONE ENCOUNTER
Pt is in need of lab work orders: pt stated she was supposed to get them done before her upcoming appointment on 10/6. Please advise. Pt is requesting a call when orders are placed.        [id number] Mary Ann Mohan: [phone]

## 2023-10-05 ENCOUNTER — HOSPITAL ENCOUNTER (OUTPATIENT)
Dept: LAB | Age: 33
Discharge: HOME | End: 2023-10-05
Payer: COMMERCIAL

## 2023-10-05 DIAGNOSIS — E28.2 PCOS (POLYCYSTIC OVARIAN SYNDROME): ICD-10-CM

## 2023-10-05 LAB
ALBUMIN SERPL-MCNC: 4 G/DL (ref 3.5–4.6)
ALP SERPL-CCNC: 47 U/L (ref 40–130)
ALT SERPL-CCNC: 14 U/L (ref 0–33)
ANION GAP SERPL CALCULATED.3IONS-SCNC: 10 MEQ/L (ref 9–15)
AST SERPL-CCNC: 17 U/L (ref 0–35)
BILIRUB SERPL-MCNC: <0.2 MG/DL (ref 0.2–0.7)
BUN SERPL-MCNC: 9 MG/DL (ref 6–20)
CALCIUM SERPL-MCNC: 9.8 MG/DL (ref 8.5–9.9)
CHLORIDE SERPL-SCNC: 104 MEQ/L (ref 95–107)
CO2 SERPL-SCNC: 24 MEQ/L (ref 20–31)
CREAT SERPL-MCNC: 0.88 MG/DL (ref 0.5–0.9)
ERYTHROCYTE [DISTWIDTH] IN BLOOD BY AUTOMATED COUNT: 13.3 % (ref 11.5–14.5)
GLOBULIN SER CALC-MCNC: 3.5 G/DL (ref 2.3–3.5)
GLUCOSE SERPL-MCNC: 103 MG/DL (ref 70–99)
HBA1C MFR BLD: 5.7 % (ref 4.8–5.9)
HCT VFR BLD AUTO: 39.5 % (ref 37–47)
HGB BLD-MCNC: 12.4 G/DL (ref 12–16)
MCH RBC QN AUTO: 26.6 PG (ref 27–31.3)
MCHC RBC AUTO-ENTMCNC: 31.4 % (ref 33–37)
MCV RBC AUTO: 84.8 FL (ref 79.4–94.8)
PLATELET # BLD AUTO: 382 K/UL (ref 130–400)
POTASSIUM SERPL-SCNC: 4.5 MEQ/L (ref 3.4–4.9)
PROT SERPL-MCNC: 7.5 G/DL (ref 6.3–8)
RBC # BLD AUTO: 4.66 M/UL (ref 4.2–5.4)
SODIUM SERPL-SCNC: 138 MEQ/L (ref 135–144)
WBC # BLD AUTO: 6.9 K/UL (ref 4.8–10.8)

## 2023-10-05 PROCEDURE — 85027 COMPLETE CBC AUTOMATED: CPT

## 2023-10-05 PROCEDURE — 82533 TOTAL CORTISOL: CPT

## 2023-10-05 PROCEDURE — 80053 COMPREHEN METABOLIC PANEL: CPT

## 2023-10-05 PROCEDURE — 83036 HEMOGLOBIN GLYCOSYLATED A1C: CPT

## 2023-10-05 PROCEDURE — 36415 COLL VENOUS BLD VENIPUNCTURE: CPT

## 2023-10-06 ENCOUNTER — OFFICE VISIT (OUTPATIENT)
Dept: ENDOCRINOLOGY | Age: 33
End: 2023-10-06
Payer: COMMERCIAL

## 2023-10-06 VITALS
HEART RATE: 67 BPM | BODY MASS INDEX: 40.02 KG/M2 | OXYGEN SATURATION: 97 % | WEIGHT: 255 LBS | SYSTOLIC BLOOD PRESSURE: 104 MMHG | DIASTOLIC BLOOD PRESSURE: 68 MMHG | HEIGHT: 67 IN

## 2023-10-06 DIAGNOSIS — E66.9 OBESITY (BMI 30-39.9): ICD-10-CM

## 2023-10-06 DIAGNOSIS — E28.2 PCOS (POLYCYSTIC OVARIAN SYNDROME): Primary | ICD-10-CM

## 2023-10-06 LAB
CORTISOL COLLECTION INFO: ABNORMAL
CORTISOL: 19.8 UG/DL (ref 2.5–19.5)

## 2023-10-06 PROCEDURE — 99214 OFFICE O/P EST MOD 30 MIN: CPT | Performed by: PHYSICIAN ASSISTANT

## 2023-10-06 RX ORDER — LIRAGLUTIDE 6 MG/ML
INJECTION, SOLUTION SUBCUTANEOUS
Qty: 3 ADJUSTABLE DOSE PRE-FILLED PEN SYRINGE | Refills: 3 | Status: SHIPPED | OUTPATIENT
Start: 2023-10-06

## 2023-10-06 RX ORDER — LIRAGLUTIDE 6 MG/ML
3 INJECTION, SOLUTION SUBCUTANEOUS DAILY
Qty: 3 ADJUSTABLE DOSE PRE-FILLED PEN SYRINGE | Refills: 3 | Status: CANCELLED
Start: 2023-10-06

## 2023-10-06 RX ORDER — FAMOTIDINE 20 MG/1
20 TABLET, FILM COATED ORAL 2 TIMES DAILY
Qty: 60 TABLET | Refills: 3 | Status: SHIPPED | OUTPATIENT
Start: 2023-10-06

## 2023-10-06 ASSESSMENT — ENCOUNTER SYMPTOMS
NAUSEA: 0
WHEEZING: 0
VOMITING: 0
ABDOMINAL PAIN: 0
SINUS PRESSURE: 0
SHORTNESS OF BREATH: 0
RHINORRHEA: 0
DIARRHEA: 0
SORE THROAT: 0
COUGH: 0

## 2023-10-06 NOTE — PATIENT INSTRUCTIONS
Continue spironolactone (Aldactone) 50 mg once a day   Start Saxenda- Inject 0.6 mg daily for one week, increase by 0.6 mg weekly to a maximum tolerable dose of 3.0 mg weekly  Zofran 4 mg ODT as needed for nausea  Repeat fasting morning 8:00 AM labs in 3 months    Follow up in 3 months

## 2023-10-06 NOTE — PROGRESS NOTES
Hormone Binding Latest Ref Range: 30 - 135 nmol/L 171 (H)   Testosterone, Free Latest Ref Range: 0.8 - 7.4 pg/mL 3.3   Testosterone, Females/Children Latest Ref Range: 9 - 55 ng/dL 66 (H)   Results for Guy Guerra (MRN [id number]) as of 7/16/2021 12:28   Ref. Range 6/26/2021 10:36   Insulin Latest Ref Range: 2.6 - 24.9 uIU/mL 11.2   Results for Guy Guerra (MRN [id number]) as of 7/16/2021 12:28   Ref. Range 6/26/2021 10:36   Prolactin Latest Units: ng/mL 20.0      Latest Reference Range & Units 8/6/22 11:27   Insulin, Free 3 - 25 uIU/mL 9   INSULIN,INS 3 - 25 uIU/mL 13     Lab Results   Component Value Date    HDL 66 (H) 06/26/2021    LDLCALC 98 06/26/2021    CHOL 183 06/26/2021    TRIG 95 06/26/2021     No results found for: \"TESTM\"  Lab Results   Component Value Date    TSH 1.080 06/26/2021    T4FREE 1.36 06/26/2021     Lab Results   Component Value Date    TPOABS <4.0 06/26/2021       Review of Systems   Constitutional:  Negative for chills, fatigue and fever. HENT:  Negative for congestion, ear pain, postnasal drip, rhinorrhea, sinus pressure and sore throat. Eyes:  Negative for visual disturbance. Respiratory:  Negative for cough, shortness of breath and wheezing. Cardiovascular:  Negative for chest pain, palpitations and leg swelling. Gastrointestinal:  Negative for abdominal pain, diarrhea, nausea and vomiting. Endocrine: Negative for cold intolerance, heat intolerance, polydipsia and polyuria. Patient denies history of pancreatitis, alcohol abuse, family or personal history of thyroid cancer, MEN 2, MTC. Genitourinary:  Negative for difficulty urinating. Musculoskeletal:  Negative for arthralgias. Skin:  Negative for rash. Allergic/Immunologic: Negative for environmental allergies. Neurological:  Negative for dizziness and headaches. Hematological:  Does not bruise/bleed easily. Psychiatric/Behavioral:  Negative for dysphoric mood.         Objective:   Physical

## 2023-10-30 ENCOUNTER — TELEPHONE (OUTPATIENT)
Dept: ENDOCRINOLOGY | Age: 33
End: 2023-10-30

## 2023-10-30 NOTE — TELEPHONE ENCOUNTER
Umm Edwards PA was done at [facility].      \"(Key: [id number])  Saxenda 18MG/3ML pen-injectors  Form: Sumit CARLSON Form (2472 NCPDP)

## 2023-11-30 ENCOUNTER — OFFICE VISIT (OUTPATIENT)
Dept: FAMILY MEDICINE CLINIC | Age: 33
End: 2023-11-30
Payer: COMMERCIAL

## 2023-11-30 VITALS
HEART RATE: 76 BPM | TEMPERATURE: 97.5 F | DIASTOLIC BLOOD PRESSURE: 84 MMHG | HEIGHT: 67 IN | BODY MASS INDEX: 40.68 KG/M2 | SYSTOLIC BLOOD PRESSURE: 128 MMHG | OXYGEN SATURATION: 99 % | WEIGHT: 259.2 LBS

## 2023-11-30 DIAGNOSIS — Z30.9 ENCOUNTER FOR CONTRACEPTIVE MANAGEMENT, UNSPECIFIED TYPE: ICD-10-CM

## 2023-11-30 DIAGNOSIS — F41.9 ANXIETY AND DEPRESSION: ICD-10-CM

## 2023-11-30 DIAGNOSIS — E28.2 PCOS (POLYCYSTIC OVARIAN SYNDROME): ICD-10-CM

## 2023-11-30 DIAGNOSIS — E24.9 HYPERCORTICISM (HCC): ICD-10-CM

## 2023-11-30 DIAGNOSIS — H60.333 ACUTE SWIMMER'S EAR OF BOTH SIDES: Primary | ICD-10-CM

## 2023-11-30 DIAGNOSIS — F32.A ANXIETY AND DEPRESSION: ICD-10-CM

## 2023-11-30 PROCEDURE — 99214 OFFICE O/P EST MOD 30 MIN: CPT | Performed by: FAMILY MEDICINE

## 2023-11-30 RX ORDER — CIPROFLOXACIN AND DEXAMETHASONE 3; 1 MG/ML; MG/ML
4 SUSPENSION/ DROPS AURICULAR (OTIC) 2 TIMES DAILY
Qty: 4 ML | Refills: 0 | Status: SHIPPED | OUTPATIENT
Start: 2023-11-30 | End: 2023-12-07

## 2023-11-30 NOTE — PROGRESS NOTES
Value Date    WBC 6.9 10/05/2023    HGB 12.4 10/05/2023    HCT 39.5 10/05/2023    MCV 84.8 10/05/2023     10/05/2023           A/P: Gina Navarrete 29 y.o. female presenting for      1. Acute swimmer's ear of both sides    - ciprofloxacin-dexamethasone (CIPRODEX) 0.3-0.1 % otic suspension; Place 4 drops into both ears 2 times daily for 7 days  Dispense: 4 mL; Refill: 0    2. Encounter for contraceptive management, unspecified type    - Segesterone-Ethinyl Estradiol 0.15-0.013 MG/24HR RING; Insert 1 ring vaginally. Following insertion, ring should remain in place for 21 continuous days (3 weeks), then removed for 7 days (1 week). This pattern (3 weeks in and 1 week out) is one cycle; one ring provides contraception for 13 cycles (1 year). Dispense: 3 each; Refill: 1    3. Anxiety and depression  Stable   Continue the zoloft   Denies any SI or HI   4. Hypercorticism ([address])  F/u with endocrinology   Has some acanthosis nigrans in the neck area. A1c is normal.     5. PCOS (polycystic ovarian syndrome)  F/u with endo                 Please note, this report has been partially produced using speech recognition software  and may cause  and /or contain errors related to that system including grammar, punctuation and spelling as well as words and phrases that may seem inappropriate. If there are questions or concerns please feel free to contact me to clarify.

## 2024-01-04 ENCOUNTER — HOSPITAL ENCOUNTER (OUTPATIENT)
Dept: LAB | Age: 34
Discharge: HOME | End: 2024-01-04
Payer: COMMERCIAL

## 2024-01-04 DIAGNOSIS — E66.9 OBESITY (BMI 30-39.9): ICD-10-CM

## 2024-01-04 DIAGNOSIS — E28.2 PCOS (POLYCYSTIC OVARIAN SYNDROME): ICD-10-CM

## 2024-01-04 LAB
ALBUMIN SERPL-MCNC: 4 G/DL (ref 3.5–4.6)
ALP SERPL-CCNC: 41 U/L (ref 40–130)
ALT SERPL-CCNC: 15 U/L (ref 0–33)
ANION GAP SERPL CALCULATED.3IONS-SCNC: 11 MEQ/L (ref 9–15)
AST SERPL-CCNC: 20 U/L (ref 0–35)
BILIRUB SERPL-MCNC: <0.2 MG/DL (ref 0.2–0.7)
BUN SERPL-MCNC: 11 MG/DL (ref 6–20)
CALCIUM SERPL-MCNC: 9.7 MG/DL (ref 8.5–9.9)
CHLORIDE SERPL-SCNC: 104 MEQ/L (ref 95–107)
CO2 SERPL-SCNC: 24 MEQ/L (ref 20–31)
CREAT SERPL-MCNC: 0.93 MG/DL (ref 0.5–0.9)
GLOBULIN SER CALC-MCNC: 3.1 G/DL (ref 2.3–3.5)
GLUCOSE SERPL-MCNC: 98 MG/DL (ref 70–99)
HBA1C MFR BLD: 5.6 % (ref 4.8–5.9)
POTASSIUM SERPL-SCNC: 4 MEQ/L (ref 3.4–4.9)
PROLACTIN SERPL-MCNC: 30 NG/ML
PROT SERPL-MCNC: 7.1 G/DL (ref 6.3–8)
SODIUM SERPL-SCNC: 139 MEQ/L (ref 135–144)

## 2024-01-04 PROCEDURE — 83527 ASSAY OF INSULIN: CPT

## 2024-01-04 PROCEDURE — 83525 ASSAY OF INSULIN: CPT

## 2024-01-04 PROCEDURE — 84146 ASSAY OF PROLACTIN: CPT

## 2024-01-04 PROCEDURE — 84270 ASSAY OF SEX HORMONE GLOBUL: CPT

## 2024-01-04 PROCEDURE — 36415 COLL VENOUS BLD VENIPUNCTURE: CPT

## 2024-01-04 PROCEDURE — 80053 COMPREHEN METABOLIC PANEL: CPT

## 2024-01-04 PROCEDURE — 84403 ASSAY OF TOTAL TESTOSTERONE: CPT

## 2024-01-04 PROCEDURE — 83036 HEMOGLOBIN GLYCOSYLATED A1C: CPT

## 2024-01-05 ENCOUNTER — OFFICE VISIT (OUTPATIENT)
Dept: ENDOCRINOLOGY | Age: 34
End: 2024-01-05
Payer: COMMERCIAL

## 2024-01-05 VITALS
WEIGHT: 259 LBS | HEART RATE: 90 BPM | BODY MASS INDEX: 40.65 KG/M2 | SYSTOLIC BLOOD PRESSURE: 102 MMHG | HEIGHT: 67 IN | OXYGEN SATURATION: 98 % | DIASTOLIC BLOOD PRESSURE: 64 MMHG

## 2024-01-05 DIAGNOSIS — E28.2 PCOS (POLYCYSTIC OVARIAN SYNDROME): Primary | ICD-10-CM

## 2024-01-05 DIAGNOSIS — L68.0 HIRSUTISM: ICD-10-CM

## 2024-01-05 LAB
SHBG SERPL-SCNC: 200 NMOL/L (ref 30–135)
TESTOST FREE SERPL-MCNC: 2.8 PG/ML (ref 1.3–9.2)
TESTOST SERPL-MCNC: 62 NG/DL (ref 20–70)

## 2024-01-05 PROCEDURE — 99214 OFFICE O/P EST MOD 30 MIN: CPT | Performed by: PHYSICIAN ASSISTANT

## 2024-01-05 RX ORDER — SPIRONOLACTONE 50 MG/1
50 TABLET, FILM COATED ORAL 2 TIMES DAILY
Qty: 180 TABLET | Refills: 1 | Status: SHIPPED | OUTPATIENT
Start: 2024-01-05

## 2024-01-05 RX ORDER — PHENTERMINE HYDROCHLORIDE 37.5 MG/1
37.5 TABLET ORAL
Qty: 30 TABLET | Refills: 0 | Status: SHIPPED | OUTPATIENT
Start: 2024-01-05 | End: 2024-04-04

## 2024-01-05 RX ORDER — FAMOTIDINE 20 MG/1
20 TABLET, FILM COATED ORAL 2 TIMES DAILY
Qty: 60 TABLET | Refills: 3 | Status: SHIPPED | OUTPATIENT
Start: 2024-01-05

## 2024-01-05 ASSESSMENT — ENCOUNTER SYMPTOMS
SHORTNESS OF BREATH: 0
ABDOMINAL PAIN: 0
VOMITING: 0
SINUS PRESSURE: 0
DIARRHEA: 0
WHEEZING: 0
COUGH: 0
NAUSEA: 0
SORE THROAT: 0
RHINORRHEA: 0

## 2024-01-05 NOTE — PROGRESS NOTES
1/5/2024    Assessment:       Diagnosis Orders   1. PCOS (polycystic ovarian syndrome)  Comprehensive Metabolic Panel    phentermine (ADIPEX-P) 37.5 MG tablet      2. Hirsutism  Comprehensive Metabolic Panel    phentermine (ADIPEX-P) 37.5 MG tablet          PLAN:     Phentermine 37.5 mg daily   Continue spironolactone (Aldactone) 50 mg twice a day   Zofran 4 mg ODT as needed for nausea  Repeat fasting morning 8:00 AM labs in 3 months    Follow up in 1 months     Orders Placed This Encounter   Procedures    Comprehensive Metabolic Panel     Standing Status:   Future     Standing Expiration Date:   1/5/2025         Orders Placed This Encounter   Medications    famotidine (PEPCID) 20 MG tablet     Sig: Take 1 tablet by mouth 2 times daily     Dispense:  60 tablet     Refill:  3    spironolactone (ALDACTONE) 50 MG tablet     Sig: Take 1 tablet by mouth 2 times daily     Dispense:  180 tablet     Refill:  1    phentermine (ADIPEX-P) 37.5 MG tablet     Sig: Take 1 tablet by mouth every morning (before breakfast) for 90 days. Max Daily Amount: 37.5 mg     Dispense:  30 tablet     Refill:  0         Return in about 6 months (around 7/5/2024) for weight management.  Subjective:     Chief Complaint   Patient presents with    Polycystic Ovarian Syndrome    Follow-up       Vitals:    01/05/24 0859   BP: 102/64   Site: Left Upper Arm   Pulse: 90   SpO2: 98%   Weight: 117.5 kg (259 lb)   Height: 1.702 m (5' 7.01\")     Wt Readings from Last 3 Encounters:   01/05/24 117.5 kg (259 lb)   11/30/23 117.6 kg (259 lb 3.2 oz)   10/06/23 115.7 kg (255 lb)     BP Readings from Last 3 Encounters:   01/05/24 102/64   11/30/23 128/84   10/06/23 104/68     Patient is a 31-year-old -American female who presents for evaluation of her PCOS today.  She was diagnosed approximately 20 years ago, has never taken any medication for that.  She has had history of dysmenorrhea with 2 to 3 months between very heavy menstrual cycles.  This has

## 2024-01-07 LAB
INSULIN FREE SERPL-ACNC: 12 UIU/ML (ref 3–25)
INSULIN SERPL-ACNC: 15 UIU/ML (ref 3–25)

## 2024-01-30 ENCOUNTER — OFFICE VISIT (OUTPATIENT)
Dept: FAMILY MEDICINE CLINIC | Age: 34
End: 2024-01-30
Payer: COMMERCIAL

## 2024-01-30 VITALS
HEART RATE: 104 BPM | DIASTOLIC BLOOD PRESSURE: 76 MMHG | OXYGEN SATURATION: 97 % | TEMPERATURE: 97.4 F | SYSTOLIC BLOOD PRESSURE: 104 MMHG

## 2024-01-30 DIAGNOSIS — J06.9 VIRAL URI: ICD-10-CM

## 2024-01-30 DIAGNOSIS — U07.1 COVID-19: Primary | ICD-10-CM

## 2024-01-30 LAB
INFLUENZA A ANTIBODY: NORMAL
INFLUENZA B ANTIBODY: NORMAL
Lab: ABNORMAL
PERFORMING INSTRUMENT: ABNORMAL
QC PASS/FAIL: ABNORMAL
SARS-COV-2, POC: DETECTED

## 2024-01-30 PROCEDURE — 87804 INFLUENZA ASSAY W/OPTIC: CPT | Performed by: PHYSICIAN ASSISTANT

## 2024-01-30 PROCEDURE — 87426 SARSCOV CORONAVIRUS AG IA: CPT | Performed by: PHYSICIAN ASSISTANT

## 2024-01-30 PROCEDURE — 99214 OFFICE O/P EST MOD 30 MIN: CPT | Performed by: PHYSICIAN ASSISTANT

## 2024-01-30 RX ORDER — GUAIFENESIN 600 MG/1
600 TABLET, EXTENDED RELEASE ORAL 2 TIMES DAILY
Qty: 30 TABLET | Refills: 0 | Status: SHIPPED | OUTPATIENT
Start: 2024-01-30 | End: 2024-02-14

## 2024-01-30 RX ORDER — FLUTICASONE PROPIONATE 50 MCG
2 SPRAY, SUSPENSION (ML) NASAL DAILY
Qty: 16 G | Refills: 0 | Status: SHIPPED | OUTPATIENT
Start: 2024-01-30

## 2024-01-30 RX ORDER — BENZONATATE 200 MG/1
200 CAPSULE ORAL 3 TIMES DAILY PRN
Qty: 90 CAPSULE | Refills: 0 | Status: SHIPPED | OUTPATIENT
Start: 2024-01-30 | End: 2024-02-29

## 2024-01-30 ASSESSMENT — ENCOUNTER SYMPTOMS
VOMITING: 0
SORE THROAT: 1
PHOTOPHOBIA: 0
RHINORRHEA: 1
COUGH: 1
EYE REDNESS: 0
EYE DISCHARGE: 0
SHORTNESS OF BREATH: 0
NAUSEA: 0
BLOOD IN STOOL: 0
EYE PAIN: 0

## 2024-01-30 ASSESSMENT — PATIENT HEALTH QUESTIONNAIRE - PHQ9
2. FEELING DOWN, DEPRESSED OR HOPELESS: 0
SUM OF ALL RESPONSES TO PHQ QUESTIONS 1-9: 0
6. FEELING BAD ABOUT YOURSELF - OR THAT YOU ARE A FAILURE OR HAVE LET YOURSELF OR YOUR FAMILY DOWN: 0
7. TROUBLE CONCENTRATING ON THINGS, SUCH AS READING THE NEWSPAPER OR WATCHING TELEVISION: 0
8. MOVING OR SPEAKING SO SLOWLY THAT OTHER PEOPLE COULD HAVE NOTICED. OR THE OPPOSITE, BEING SO FIGETY OR RESTLESS THAT YOU HAVE BEEN MOVING AROUND A LOT MORE THAN USUAL: 0
SUM OF ALL RESPONSES TO PHQ9 QUESTIONS 1 & 2: 0
5. POOR APPETITE OR OVEREATING: 0
SUM OF ALL RESPONSES TO PHQ QUESTIONS 1-9: 0
9. THOUGHTS THAT YOU WOULD BE BETTER OFF DEAD, OR OF HURTING YOURSELF: 0
SUM OF ALL RESPONSES TO PHQ QUESTIONS 1-9: 0
SUM OF ALL RESPONSES TO PHQ QUESTIONS 1-9: 0
4. FEELING TIRED OR HAVING LITTLE ENERGY: 0
10. IF YOU CHECKED OFF ANY PROBLEMS, HOW DIFFICULT HAVE THESE PROBLEMS MADE IT FOR YOU TO DO YOUR WORK, TAKE CARE OF THINGS AT HOME, OR GET ALONG WITH OTHER PEOPLE: 0
1. LITTLE INTEREST OR PLEASURE IN DOING THINGS: 0
3. TROUBLE FALLING OR STAYING ASLEEP: 0

## 2024-01-30 NOTE — PROGRESS NOTES
Subjective:      Patient ID: Octavia Murray is a 33 y.o. female who presents today for:  Chief Complaint   Patient presents with    Congestion     Ear pressure, runny nose, cough, headache        Sx started (sore throat), Sunday coughing, congestion  Sunday, s/o tested positive, now pt positive in clinic today  Now has HA, moist cough, runny nose, body aches, low grade temp 99.3F, fatigue, pressure in both ears   Cold sweats, intermittent nausea       Past Medical History:   Diagnosis Date    PCOS (polycystic ovarian syndrome)     Shingles     Thyroid disease      Past Surgical History:   Procedure Laterality Date    LAPAROSCOPY       Family History   Problem Relation Age of Onset    Cancer Mother         multiple myeloma    No Known Problems Father     Stroke Maternal Aunt      Social History     Socioeconomic History    Marital status: Single     Spouse name: Not on file    Number of children: Not on file    Years of education: Not on file    Highest education level: Not on file   Occupational History    Not on file   Tobacco Use    Smoking status: Never     Passive exposure: Never    Smokeless tobacco: Never   Vaping Use    Vaping Use: Never used   Substance and Sexual Activity    Alcohol use: Yes     Comment: socially    Drug use: Never    Sexual activity: Yes     Partners: Male   Other Topics Concern    Not on file   Social History Narrative    Not on file     Social Determinants of Health     Financial Resource Strain: Medium Risk (3/10/2023)    Overall Financial Resource Strain (CARDIA)     Difficulty of Paying Living Expenses: Somewhat hard   Food Insecurity: Not on file (3/10/2023)   Transportation Needs: Unknown (3/10/2023)    PRAPARE - Transportation     Lack of Transportation (Medical): Not on file     Lack of Transportation (Non-Medical): No   Physical Activity: Sufficiently Active (6/9/2022)    Exercise Vital Sign     Days of Exercise per Week: 5 days     Minutes of Exercise per Session: 50 min

## 2024-02-09 ENCOUNTER — OFFICE VISIT (OUTPATIENT)
Dept: ENDOCRINOLOGY | Age: 34
End: 2024-02-09
Payer: COMMERCIAL

## 2024-02-09 VITALS
OXYGEN SATURATION: 99 % | BODY MASS INDEX: 39.08 KG/M2 | DIASTOLIC BLOOD PRESSURE: 66 MMHG | HEIGHT: 67 IN | SYSTOLIC BLOOD PRESSURE: 110 MMHG | HEART RATE: 84 BPM | WEIGHT: 249 LBS

## 2024-02-09 DIAGNOSIS — E28.2 PCOS (POLYCYSTIC OVARIAN SYNDROME): Primary | ICD-10-CM

## 2024-02-09 DIAGNOSIS — E66.01 CLASS 3 SEVERE OBESITY DUE TO EXCESS CALORIES WITHOUT SERIOUS COMORBIDITY WITH BODY MASS INDEX (BMI) OF 40.0 TO 44.9 IN ADULT (HCC): ICD-10-CM

## 2024-02-09 DIAGNOSIS — L68.0 HIRSUTISM: ICD-10-CM

## 2024-02-09 PROBLEM — E66.813 CLASS 3 SEVERE OBESITY DUE TO EXCESS CALORIES WITH BODY MASS INDEX (BMI) OF 40.0 TO 44.9 IN ADULT: Status: ACTIVE | Noted: 2024-02-09

## 2024-02-09 PROBLEM — E24.9 HYPERCORTICISM (HCC): Status: RESOLVED | Noted: 2022-08-19 | Resolved: 2024-02-09

## 2024-02-09 PROCEDURE — 99214 OFFICE O/P EST MOD 30 MIN: CPT | Performed by: PHYSICIAN ASSISTANT

## 2024-02-09 RX ORDER — PHENTERMINE HYDROCHLORIDE 37.5 MG/1
37.5 TABLET ORAL
Qty: 30 TABLET | Refills: 2 | Status: SHIPPED | OUTPATIENT
Start: 2024-02-09 | End: 2024-05-09

## 2024-02-09 ASSESSMENT — ENCOUNTER SYMPTOMS
SORE THROAT: 0
NAUSEA: 0
COUGH: 0
SINUS PRESSURE: 0
ABDOMINAL PAIN: 0
VOMITING: 0
DIARRHEA: 0
RHINORRHEA: 0
SHORTNESS OF BREATH: 0
WHEEZING: 0

## 2024-02-09 NOTE — PROGRESS NOTES
Cervical back: Normal range of motion and neck supple.   Skin:     General: Skin is warm and dry.   Neurological:      General: No focal deficit present.      Mental Status: She is alert and oriented to person, place, and time.   Psychiatric:         Mood and Affect: Mood normal.

## 2024-02-09 NOTE — PATIENT INSTRUCTIONS
Phentermine 37.5 mg daily   Continue spironolactone (Aldactone) 50 mg twice a day   Zofran 4 mg ODT as needed for nausea    Follow up in 3 months

## 2024-05-28 SDOH — ECONOMIC STABILITY: INCOME INSECURITY: HOW HARD IS IT FOR YOU TO PAY FOR THE VERY BASICS LIKE FOOD, HOUSING, MEDICAL CARE, AND HEATING?: NOT HARD AT ALL

## 2024-05-28 SDOH — ECONOMIC STABILITY: FOOD INSECURITY: WITHIN THE PAST 12 MONTHS, THE FOOD YOU BOUGHT JUST DIDN'T LAST AND YOU DIDN'T HAVE MONEY TO GET MORE.: NEVER TRUE

## 2024-05-28 SDOH — ECONOMIC STABILITY: FOOD INSECURITY: WITHIN THE PAST 12 MONTHS, YOU WORRIED THAT YOUR FOOD WOULD RUN OUT BEFORE YOU GOT MONEY TO BUY MORE.: NEVER TRUE

## 2024-05-29 ENCOUNTER — OFFICE VISIT (OUTPATIENT)
Dept: FAMILY MEDICINE CLINIC | Age: 34
End: 2024-05-29
Payer: COMMERCIAL

## 2024-05-29 VITALS
HEIGHT: 67 IN | SYSTOLIC BLOOD PRESSURE: 116 MMHG | TEMPERATURE: 97.6 F | OXYGEN SATURATION: 98 % | WEIGHT: 241 LBS | HEART RATE: 85 BPM | BODY MASS INDEX: 37.83 KG/M2 | DIASTOLIC BLOOD PRESSURE: 74 MMHG

## 2024-05-29 DIAGNOSIS — E28.2 PCOS (POLYCYSTIC OVARIAN SYNDROME): ICD-10-CM

## 2024-05-29 DIAGNOSIS — F41.9 ANXIETY AND DEPRESSION: ICD-10-CM

## 2024-05-29 DIAGNOSIS — H60.332 ACUTE SWIMMER'S EAR OF LEFT SIDE: Primary | ICD-10-CM

## 2024-05-29 DIAGNOSIS — F32.A ANXIETY AND DEPRESSION: ICD-10-CM

## 2024-05-29 PROCEDURE — 99214 OFFICE O/P EST MOD 30 MIN: CPT | Performed by: FAMILY MEDICINE

## 2024-05-29 RX ORDER — SPIRONOLACTONE 50 MG/1
50 TABLET, FILM COATED ORAL 2 TIMES DAILY
Qty: 180 TABLET | Refills: 1 | Status: SHIPPED | OUTPATIENT
Start: 2024-05-29

## 2024-05-29 RX ORDER — SERTRALINE HYDROCHLORIDE 100 MG/1
100 TABLET, FILM COATED ORAL DAILY
Qty: 90 TABLET | Refills: 1 | Status: SHIPPED | OUTPATIENT
Start: 2024-05-29

## 2024-05-29 RX ORDER — CIPROFLOXACIN AND DEXAMETHASONE 3; 1 MG/ML; MG/ML
4 SUSPENSION/ DROPS AURICULAR (OTIC) 2 TIMES DAILY
Qty: 2.5 ML | Refills: 0 | Status: SHIPPED | OUTPATIENT
Start: 2024-05-29 | End: 2024-06-05

## 2024-05-29 NOTE — PROGRESS NOTES
Chief Complaint   Patient presents with    6 Month Follow-Up     Patient report sneezing, coughing & sore throat that started 2 days ago, not sure if it's from allergies.    Constipations from phentermine (given by endo) and insomnia .          HPI: Octavia Murray 34 y.o. female presenting for       Left ear pain   Going on for the last week  No discharge or bleeding   Admits to sneezing and sore throat and coughing.        Varicose veins   Back of the left knee   Has not tried any measures     F/u   No changes one that is prominent on the left leg     Family history of colon cancer   Mainly aunts   No 1st degree relatives with colon cancer     Patient is on pill and nuvaring   Reports that the nuvaring caused indegestion and was not complaint with the pills   Denies nay history of migraines   Denies any fever, chills, nausea, vomiting chest pain, shortenss of breath, abomdinal pain     F/u   Stable       PCOS   On the spironolactone   Will help with the hair   Sees endocrinology   Needs a refill.       Depression and anxiety   zoloft 100 mg daily   Stable     F/u   Doing well on the zoloft.     Current Outpatient Medications   Medication Sig Dispense Refill    fluticasone (FLONASE) 50 MCG/ACT nasal spray 2 sprays by Each Nostril route daily 16 g 0    famotidine (PEPCID) 20 MG tablet Take 1 tablet by mouth 2 times daily 60 tablet 3    spironolactone (ALDACTONE) 50 MG tablet Take 1 tablet by mouth 2 times daily 180 tablet 1    Segesterone-Ethinyl Estradiol 0.15-0.013 MG/24HR RING Insert 1 ring vaginally. Following insertion, ring should remain in place for 21 continuous days (3 weeks), then removed for 7 days (1 week). This pattern (3 weeks in and 1 week out) is one cycle; one ring provides contraception for 13 cycles (1 year). 3 each 1    Omeprazole Magnesium (PRILOSEC PO) Take by mouth      sertraline (ZOLOFT) 100 MG tablet Take 1 tablet by mouth daily 90 tablet 1     No current facility-administered medications

## 2024-05-31 ENCOUNTER — OFFICE VISIT (OUTPATIENT)
Dept: ENDOCRINOLOGY | Age: 34
End: 2024-05-31
Payer: COMMERCIAL

## 2024-05-31 VITALS
BODY MASS INDEX: 37.35 KG/M2 | HEART RATE: 52 BPM | WEIGHT: 238 LBS | DIASTOLIC BLOOD PRESSURE: 82 MMHG | HEIGHT: 67 IN | SYSTOLIC BLOOD PRESSURE: 118 MMHG

## 2024-05-31 DIAGNOSIS — E28.2 PCOS (POLYCYSTIC OVARIAN SYNDROME): Primary | ICD-10-CM

## 2024-05-31 DIAGNOSIS — E66.01 CLASS 3 SEVERE OBESITY DUE TO EXCESS CALORIES WITHOUT SERIOUS COMORBIDITY WITH BODY MASS INDEX (BMI) OF 40.0 TO 44.9 IN ADULT (HCC): ICD-10-CM

## 2024-05-31 PROCEDURE — 99214 OFFICE O/P EST MOD 30 MIN: CPT | Performed by: PHYSICIAN ASSISTANT

## 2024-05-31 RX ORDER — PHENTERMINE HYDROCHLORIDE 37.5 MG/1
37.5 TABLET ORAL
Qty: 30 TABLET | Refills: 2 | Status: SHIPPED | OUTPATIENT
Start: 2024-05-31 | End: 2024-08-29

## 2024-05-31 ASSESSMENT — ENCOUNTER SYMPTOMS
SINUS PRESSURE: 0
NAUSEA: 0
WHEEZING: 0
SORE THROAT: 0
COUGH: 0
SHORTNESS OF BREATH: 0
ABDOMINAL PAIN: 0
VOMITING: 0
DIARRHEA: 0
RHINORRHEA: 0

## 2024-05-31 NOTE — PROGRESS NOTES
5/31/2024    Assessment:       Diagnosis Orders   1. PCOS (polycystic ovarian syndrome)  Comprehensive Metabolic Panel    phentermine (ADIPEX-P) 37.5 MG tablet    [facility]      2. Class 3 severe obesity due to excess calories without serious comorbidity with body mass index (BMI) of 40.0 to 44.9 in adult (HCC)  phentermine (ADIPEX-P) 37.5 MG tablet    [facility] [place]          PLAN:     Phentermine 37.5 mg daily   Continue spironolactone (Aldactone) 50 mg twice a day   Zofran 4 mg ODT as needed for nausea    Follow up in 3 months     Orders Placed This Encounter   Procedures    Comprehensive Metabolic Panel     Standing Status:   Future     Standing Expiration Date:   5/31/2025    [facility] [place]     Referral Priority:   Routine     Referral Type:   Eval and Treat     Referral Reason:   Specialty Services Required     Requested Specialty:   Nutrition     Number of Visits Requested:   1         Orders Placed This Encounter   Medications    phentermine (ADIPEX-P) 37.5 MG tablet     Sig: Take 1 tablet by mouth every morning (before breakfast) for 90 days. Max Daily Amount: 37.5 mg     Dispense:  30 tablet     Refill:  2         Return in about 3 months (around 8/31/2024) for Diabetes.  Subjective:     Chief Complaint   Patient presents with    Polycystic Ovarian Syndrome       Vitals:    05/31/24 0845   BP: 118/82   Pulse: 52   Weight: 108 kg (238 lb)   Height: 1.702 m (5' 7\")       Wt Readings from Last 3 Encounters:   05/31/24 108 kg (238 lb)   05/29/24 109.3 kg (241 lb)   02/09/24 112.9 kg (249 lb)     BP Readings from Last 3 Encounters:   05/31/24 118/82   05/29/24 116/74   02/09/24 110/66     Patient is a 34-year-old female who presents for evaluation of her PCOS today.  She was diagnosed approximately 20 years ago, has never taken any medication for that.  She has had history of dysmenorrhea with 2 to 3 months between very heavy menstrual cycles.  This has

## 2024-09-19 ENCOUNTER — HOSPITAL ENCOUNTER (OUTPATIENT)
Dept: NUTRITION | Age: 34
Discharge: HOME | End: 2024-09-19
Payer: COMMERCIAL

## 2024-09-19 ENCOUNTER — HOSPITAL ENCOUNTER (OUTPATIENT)
Dept: LAB | Age: 34
Discharge: HOME | End: 2024-09-19
Payer: COMMERCIAL

## 2024-09-19 DIAGNOSIS — E28.2 PCOS (POLYCYSTIC OVARIAN SYNDROME): ICD-10-CM

## 2024-09-19 LAB
ALBUMIN SERPL-MCNC: 3.9 G/DL (ref 3.5–4.6)
ALP SERPL-CCNC: 42 U/L (ref 40–130)
ALT SERPL-CCNC: 13 U/L (ref 0–33)
ANION GAP SERPL CALCULATED.3IONS-SCNC: 7 MEQ/L (ref 9–15)
AST SERPL-CCNC: 18 U/L (ref 0–35)
BILIRUB SERPL-MCNC: <0.2 MG/DL (ref 0.2–0.7)
BUN SERPL-MCNC: 13 MG/DL (ref 6–20)
CALCIUM SERPL-MCNC: 9.3 MG/DL (ref 8.5–9.9)
CHLORIDE SERPL-SCNC: 104 MEQ/L (ref 95–107)
CO2 SERPL-SCNC: 28 MEQ/L (ref 20–31)
CREAT SERPL-MCNC: 0.9 MG/DL (ref 0.5–0.9)
GLOBULIN SER CALC-MCNC: 3.1 G/DL (ref 2.3–3.5)
GLUCOSE SERPL-MCNC: 98 MG/DL (ref 70–99)
POTASSIUM SERPL-SCNC: 3.8 MEQ/L (ref 3.4–4.9)
PROT SERPL-MCNC: 7 G/DL (ref 6.3–8)
SODIUM SERPL-SCNC: 139 MEQ/L (ref 135–144)

## 2024-09-19 PROCEDURE — 80053 COMPREHEN METABOLIC PANEL: CPT

## 2024-09-19 PROCEDURE — 36415 COLL VENOUS BLD VENIPUNCTURE: CPT

## 2024-09-19 PROCEDURE — 97803 MED NUTRITION INDIV SUBSEQ: CPT

## 2024-09-27 ENCOUNTER — OFFICE VISIT (OUTPATIENT)
Dept: ENDOCRINOLOGY | Age: 34
End: 2024-09-27
Payer: COMMERCIAL

## 2024-09-27 VITALS
DIASTOLIC BLOOD PRESSURE: 78 MMHG | WEIGHT: 238 LBS | HEART RATE: 75 BPM | SYSTOLIC BLOOD PRESSURE: 110 MMHG | HEIGHT: 67 IN | BODY MASS INDEX: 37.35 KG/M2

## 2024-09-27 DIAGNOSIS — E28.2 PCOS (POLYCYSTIC OVARIAN SYNDROME): Primary | ICD-10-CM

## 2024-09-27 PROCEDURE — 99214 OFFICE O/P EST MOD 30 MIN: CPT | Performed by: PHYSICIAN ASSISTANT

## 2024-09-27 RX ORDER — SPIRONOLACTONE 50 MG/1
50 TABLET, FILM COATED ORAL 2 TIMES DAILY
Qty: 180 TABLET | Refills: 5 | Status: SHIPPED | OUTPATIENT
Start: 2024-09-27

## 2024-09-27 ASSESSMENT — ENCOUNTER SYMPTOMS
VOMITING: 0
DIARRHEA: 0
SHORTNESS OF BREATH: 0
SINUS PRESSURE: 0
WHEEZING: 0
NAUSEA: 0
SORE THROAT: 0
RHINORRHEA: 0
ABDOMINAL PAIN: 0
COUGH: 0

## 2024-10-14 ENCOUNTER — OFFICE VISIT (OUTPATIENT)
Dept: OBGYN CLINIC | Age: 34
End: 2024-10-14
Payer: COMMERCIAL

## 2024-10-14 ENCOUNTER — HOSPITAL ENCOUNTER (OUTPATIENT)
Age: 34
Setting detail: SPECIMEN
Discharge: HOME | End: 2024-10-14
Payer: COMMERCIAL

## 2024-10-14 VITALS
WEIGHT: 236 LBS | BODY MASS INDEX: 37.04 KG/M2 | HEIGHT: 67 IN | SYSTOLIC BLOOD PRESSURE: 112 MMHG | DIASTOLIC BLOOD PRESSURE: 68 MMHG

## 2024-10-14 DIAGNOSIS — Z01.419 ENCOUNTER FOR ANNUAL ROUTINE GYNECOLOGICAL EXAMINATION: Primary | ICD-10-CM

## 2024-10-14 DIAGNOSIS — Z11.51 SCREENING FOR HUMAN PAPILLOMAVIRUS: ICD-10-CM

## 2024-10-14 DIAGNOSIS — N89.8 VAGINAL DISCHARGE: ICD-10-CM

## 2024-10-14 DIAGNOSIS — Z30.9 ENCOUNTER FOR CONTRACEPTIVE MANAGEMENT, UNSPECIFIED TYPE: ICD-10-CM

## 2024-10-14 DIAGNOSIS — Z01.419 ENCOUNTER FOR ANNUAL ROUTINE GYNECOLOGICAL EXAMINATION: ICD-10-CM

## 2024-10-14 PROCEDURE — 87210 SMEAR WET MOUNT SALINE/INK: CPT

## 2024-10-14 PROCEDURE — 99395 PREV VISIT EST AGE 18-39: CPT | Performed by: OBSTETRICS & GYNECOLOGY

## 2024-10-14 PROCEDURE — 87624 HPV HI-RISK TYP POOLED RSLT: CPT

## 2024-10-14 PROCEDURE — 87808 TRICHOMONAS ASSAY W/OPTIC: CPT

## 2024-10-14 RX ORDER — SEGESTERONE ACETATE AND ETHINYL ESTRADIOL 103; 17.4 MG/1; MG/1
1 RING VAGINAL WEEKLY
Qty: 9 EACH | Refills: 3 | Status: SHIPPED | OUTPATIENT
Start: 2024-10-14

## 2024-10-14 ASSESSMENT — ENCOUNTER SYMPTOMS
WHEEZING: 0
ABDOMINAL PAIN: 0
ABDOMINAL DISTENTION: 0
SHORTNESS OF BREATH: 0
BLOOD IN STOOL: 0
NAUSEA: 0
CONSTIPATION: 0
SORE THROAT: 0
COUGH: 0
DIARRHEA: 0
VOMITING: 0

## 2024-10-14 NOTE — PROGRESS NOTES
Subjective:      Octavia Murray is a 34 y.o. female  here for routine exam.  Current Complaints: normal menses, no abnormal bleeding, pelvic pain or discharge, no breast pain or new or enlarging lumps on self exam.    Menstrual history:   regular menses 30 days  Sexual activity:  yes, denies knowledge of risky exposure  Abnormalvaginal discharge:  No  Contraceptive method:  annovera ring    Vitals:  /68   Ht 1.702 m (5' 7\")   Wt 107 kg (236 lb)   LMP 10/08/2024   BMI 36.96 kg/m²   Allergies:Codeine  Past Medical History:   Diagnosis Date    Abnormal Pap smear of cervix     Drug effect     Allergic to codeine    PCOS (polycystic ovarian syndrome)     Shingles     Thyroid disease      Past Surgical History:   Procedure Laterality Date    LAPAROSCOPY       Family History   Problem Relation Age of Onset    Cancer Mother         Multiple Myeloma    No Known Problems Father     Stroke Maternal Aunt      Social History     Socioeconomic History    Marital status: Single     Spouse name: Not on file    Number of children: Not on file    Years of education: Not on file    Highest education level: Not on file   Occupational History    Not on file   Tobacco Use    Smoking status: Never     Passive exposure: Never    Smokeless tobacco: Never   Vaping Use    Vaping status: Never Used   Substance and Sexual Activity    Alcohol use: Yes     Comment: Not actively drinking    Drug use: Never    Sexual activity: Yes     Partners: Male   Other Topics Concern    Not on file   Social History Narrative    Not on file     Social Determinants of Health     Financial Resource Strain: Low Risk  (2024)    Overall Financial Resource Strain (CARDIA)     Difficulty of Paying Living Expenses: Not hard at all   Food Insecurity: No Food Insecurity (2024)    Hunger Vital Sign     Worried About Running Out of Food in the Last Year: Never true     Ran Out of Food in the Last Year: Never true   Transportation Needs:

## 2024-10-15 LAB
CLUE CELLS VAG QL WET PREP: NORMAL
T VAGINALIS VAG QL WET PREP: NORMAL
TRICHOMONAS VAGINALIS SCREEN: NEGATIVE
YEAST VAG QL WET PREP: NORMAL

## 2024-10-18 LAB
HPV HR 12 DNA SPEC QL NAA+PROBE: DETECTED
HPV16 DNA SPEC QL NAA+PROBE: NOT DETECTED
HPV16+18+H RISK 12 DNA SPEC-IMP: ABNORMAL
HPV18 DNA SPEC QL NAA+PROBE: NOT DETECTED

## 2024-11-12 ENCOUNTER — OFFICE VISIT (OUTPATIENT)
Dept: FAMILY MEDICINE CLINIC | Age: 34
End: 2024-11-12
Payer: COMMERCIAL

## 2024-11-12 VITALS
DIASTOLIC BLOOD PRESSURE: 84 MMHG | WEIGHT: 238.4 LBS | HEIGHT: 67 IN | SYSTOLIC BLOOD PRESSURE: 128 MMHG | BODY MASS INDEX: 37.42 KG/M2 | HEART RATE: 79 BPM | TEMPERATURE: 97.4 F | OXYGEN SATURATION: 100 %

## 2024-11-12 DIAGNOSIS — F41.9 ANXIETY AND DEPRESSION: ICD-10-CM

## 2024-11-12 DIAGNOSIS — I83.893 SYMPTOMATIC VARICOSE VEINS OF BOTH LOWER EXTREMITIES: Primary | ICD-10-CM

## 2024-11-12 DIAGNOSIS — F32.A ANXIETY AND DEPRESSION: ICD-10-CM

## 2024-11-12 DIAGNOSIS — K59.03 DRUG INDUCED CONSTIPATION: ICD-10-CM

## 2024-11-12 PROCEDURE — 99214 OFFICE O/P EST MOD 30 MIN: CPT | Performed by: FAMILY MEDICINE

## 2024-11-12 RX ORDER — PHENTERMINE HYDROCHLORIDE 37.5 MG/1
TABLET ORAL
COMMUNITY
Start: 2024-09-15

## 2024-11-12 NOTE — PROGRESS NOTES
Chief Complaint   Patient presents with    Other     Patient c/o veins on back of left leg becoming more pronounced. Patient is wanting to discuss compression stockings.     Medication Adjustment     Patient requesting depression medication dosage to be lowered. Patient states she is feeling tired and will yawn often and seems \"out of it\"          HPI: Octavia Murray 34 y.o. female presenting for       Varicose veins   Back of the left knee   Has not tried any measures     F/u   No changes one that is prominent on the left leg   Admits to symptomatic when she is sitting.    Family history of colon cancer   Mainly aunts   No 1st degree relatives with colon cancer     Patient is on pill and nuvaring   Reports that the nuvaring caused indegestion and was not complaint with the pills   Denies nay history of migraines   Denies any fever, chills, nausea, vomiting chest pain, shortenss of breath, abomdinal pain     F/u   Stable       PCOS   On the spironolactone   Will help with the hair   Sees endocrinology   Needs a refill.       Depression and anxiety   zoloft 100 mg daily   Stable     F/u   Reports that at the 100 mg she notices that she is yawning     Current Outpatient Medications   Medication Sig Dispense Refill    phentermine (ADIPEX-P) 37.5 MG tablet TAKE 1 TABLET BY MOUTH EVERY MORNING (BEFORE BREAKFAST) FOR 90 DAYS. MAX DAILY AMOUNT: 37.5 MG      Segesterone-Ethinyl Estradiol (ANNOVERA) 0.15-0.013 MG/24HR RING Place 1 Ring vaginally once a week 9 each 3    spironolactone (ALDACTONE) 50 MG tablet Take 1 tablet by mouth 2 times daily 180 tablet 5    sertraline (ZOLOFT) 100 MG tablet Take 1 tablet by mouth daily 90 tablet 1    fluticasone (FLONASE) 50 MCG/ACT nasal spray 2 sprays by Each Nostril route daily 16 g 0     No current facility-administered medications for this visit.        ROS  CONSTITUTIONAL: The patient denies fevers, chills, sweats and body ache.  HEENT: Denies headache, blurry vision, eye pain,

## 2024-12-10 ENCOUNTER — OFFICE VISIT (OUTPATIENT)
Dept: FAMILY MEDICINE CLINIC | Age: 34
End: 2024-12-10

## 2024-12-10 ENCOUNTER — HOSPITAL ENCOUNTER (OUTPATIENT)
Age: 34
Setting detail: SPECIMEN
Discharge: HOME | End: 2024-12-10
Payer: COMMERCIAL

## 2024-12-10 VITALS
HEART RATE: 71 BPM | HEIGHT: 67 IN | WEIGHT: 239.6 LBS | BODY MASS INDEX: 37.61 KG/M2 | TEMPERATURE: 98 F | OXYGEN SATURATION: 99 % | DIASTOLIC BLOOD PRESSURE: 78 MMHG | SYSTOLIC BLOOD PRESSURE: 116 MMHG

## 2024-12-10 DIAGNOSIS — J06.9 UPPER RESPIRATORY INFECTION, ACUTE: ICD-10-CM

## 2024-12-10 DIAGNOSIS — R68.89 FLU-LIKE SYMPTOMS: ICD-10-CM

## 2024-12-10 DIAGNOSIS — J02.9 SORE THROAT: Primary | ICD-10-CM

## 2024-12-10 DIAGNOSIS — J02.9 SORE THROAT: ICD-10-CM

## 2024-12-10 LAB
INFLUENZA A ANTIBODY: NORMAL
INFLUENZA B ANTIBODY: NORMAL
S PYO AG THROAT QL: NORMAL

## 2024-12-10 PROCEDURE — 87070 CULTURE OTHR SPECIMN AEROBIC: CPT

## 2024-12-10 RX ORDER — AZITHROMYCIN 500 MG/1
500 TABLET, FILM COATED ORAL DAILY
Qty: 7 TABLET | Refills: 0 | Status: SHIPPED | OUTPATIENT
Start: 2024-12-10 | End: 2024-12-17

## 2024-12-10 ASSESSMENT — ENCOUNTER SYMPTOMS
CONSTIPATION: 0
BLOOD IN STOOL: 0
WHEEZING: 0
SORE THROAT: 1
COUGH: 0
DIARRHEA: 0
NAUSEA: 0
SINUS PAIN: 0
SHORTNESS OF BREATH: 0
VOICE CHANGE: 0
ABDOMINAL PAIN: 0
CHEST TIGHTNESS: 0

## 2024-12-10 NOTE — PROGRESS NOTES
Judgment: Judgment normal.        Results for orders placed or performed in visit on 12/10/24   POCT rapid strep A   Result Value Ref Range    Strep A Ag None Detected None Detected   POCT Influenza A/B   Result Value Ref Range    Influenza A Ab -     Influenza B Ab -       Allergies   Allergen Reactions    Codeine        Current Outpatient Medications:     azithromycin (ZITHROMAX) 500 MG tablet, Take 1 tablet by mouth daily for 7 days, Disp: 7 tablet, Rfl: 0    phentermine (ADIPEX-P) 37.5 MG tablet, TAKE 1 TABLET BY MOUTH EVERY MORNING (BEFORE BREAKFAST) FOR 90 DAYS. MAX DAILY AMOUNT: 37.5 MG, Disp: , Rfl:     sertraline (ZOLOFT) 50 MG tablet, Take 1.5 tablets by mouth daily, Disp: 135 tablet, Rfl: 1    psyllium (KONSYL) 28.3 % POWD powder, Take 3.4 g by mouth daily as needed for Constipation, Disp: 1368 g, Rfl: 0    Elastic Bandages & Supports (MEDICAL COMPRESSION STOCKINGS) MISC, 20-30 mm Hg Bilaterally Thigh high Use as directed for lower extremity swelling Dispense 1 pair., Disp: 2 each, Rfl: 0    Segesterone-Ethinyl Estradiol (ANNOVERA) 0.15-0.013 MG/24HR RING, Place 1 Ring vaginally once a week, Disp: 9 each, Rfl: 3    spironolactone (ALDACTONE) 50 MG tablet, Take 1 tablet by mouth 2 times daily, Disp: 180 tablet, Rfl: 5    fluticasone (FLONASE) 50 MCG/ACT nasal spray, 2 sprays by Each Nostril route daily, Disp: 16 g, Rfl: 0   Past Medical History:   Diagnosis Date    Abnormal Pap smear of cervix 2015    Drug effect     Allergic to codeine    PCOS (polycystic ovarian syndrome)     Shingles     Thyroid disease      Past Surgical History:   Procedure Laterality Date    LAPAROSCOPY       Family History   Problem Relation Age of Onset    Cancer Mother         Multiple Myeloma    No Known Problems Father     Stroke Maternal Aunt      Social History     Tobacco Use    Smoking status: Never     Passive exposure: Never    Smokeless tobacco: Never   Substance Use Topics    Alcohol use: Yes     Comment: Not actively

## 2024-12-13 LAB — BACTERIA THROAT AEROBE CULT: NORMAL

## 2025-01-09 PROBLEM — J02.9 SORE THROAT: Status: RESOLVED | Noted: 2024-12-10 | Resolved: 2025-01-09

## 2025-03-17 ENCOUNTER — TELEPHONE (OUTPATIENT)
Dept: ENDOCRINOLOGY | Age: 35
End: 2025-03-17

## 2025-03-17 DIAGNOSIS — E28.2 PCOS (POLYCYSTIC OVARIAN SYNDROME): Primary | ICD-10-CM

## 2025-03-25 ENCOUNTER — HOSPITAL ENCOUNTER (OUTPATIENT)
Dept: LAB | Age: 35
Discharge: HOME | End: 2025-03-25
Payer: COMMERCIAL

## 2025-03-25 DIAGNOSIS — E28.2 PCOS (POLYCYSTIC OVARIAN SYNDROME): ICD-10-CM

## 2025-03-25 LAB
ALBUMIN SERPL-MCNC: 3.7 G/DL (ref 3.5–4.6)
ALP SERPL-CCNC: 41 U/L (ref 40–130)
ALT SERPL-CCNC: 16 U/L (ref 0–33)
ANION GAP SERPL CALCULATED.3IONS-SCNC: 9 MEQ/L (ref 9–15)
AST SERPL-CCNC: 18 U/L (ref 0–35)
BILIRUB SERPL-MCNC: <0.2 MG/DL (ref 0.2–0.7)
BUN SERPL-MCNC: 12 MG/DL (ref 6–20)
CALCIUM SERPL-MCNC: 9.2 MG/DL (ref 8.5–9.9)
CHLORIDE SERPL-SCNC: 104 MEQ/L (ref 95–107)
CO2 SERPL-SCNC: 26 MEQ/L (ref 20–31)
CREAT SERPL-MCNC: 0.89 MG/DL (ref 0.5–0.9)
GLOBULIN SER CALC-MCNC: 3.3 G/DL (ref 2.3–3.5)
GLUCOSE SERPL-MCNC: 104 MG/DL (ref 70–99)
POTASSIUM SERPL-SCNC: 4.2 MEQ/L (ref 3.4–4.9)
PROT SERPL-MCNC: 7 G/DL (ref 6.3–8)
SODIUM SERPL-SCNC: 139 MEQ/L (ref 135–144)

## 2025-03-25 PROCEDURE — 84270 ASSAY OF SEX HORMONE GLOBUL: CPT

## 2025-03-25 PROCEDURE — 83527 ASSAY OF INSULIN: CPT

## 2025-03-25 PROCEDURE — 83525 ASSAY OF INSULIN: CPT

## 2025-03-25 PROCEDURE — 84403 ASSAY OF TOTAL TESTOSTERONE: CPT

## 2025-03-25 PROCEDURE — 36415 COLL VENOUS BLD VENIPUNCTURE: CPT

## 2025-03-25 PROCEDURE — 80053 COMPREHEN METABOLIC PANEL: CPT

## 2025-03-25 PROCEDURE — 83036 HEMOGLOBIN GLYCOSYLATED A1C: CPT

## 2025-03-26 LAB
ESTIMATED AVERAGE GLUCOSE: 105 MG/DL
HBA1C MFR BLD: 5.3 % (ref 4–6)
SHBG SERPL-SCNC: 200 NMOL/L (ref 25–122)
TESTOST FREE SERPL-MCNC: 2.5 PG/ML (ref 1.3–9.2)
TESTOST SERPL-MCNC: 55 NG/DL (ref 8–48)

## 2025-03-28 LAB
INSULIN FREE SERPL-ACNC: 39 UIU/ML (ref 3–25)
INSULIN SERPL-ACNC: 54 UIU/ML (ref 3–25)

## 2025-04-04 ENCOUNTER — OFFICE VISIT (OUTPATIENT)
Dept: ENDOCRINOLOGY | Age: 35
End: 2025-04-04
Payer: COMMERCIAL

## 2025-04-04 VITALS
WEIGHT: 254 LBS | SYSTOLIC BLOOD PRESSURE: 98 MMHG | DIASTOLIC BLOOD PRESSURE: 64 MMHG | HEIGHT: 67 IN | HEART RATE: 78 BPM | BODY MASS INDEX: 39.87 KG/M2 | OXYGEN SATURATION: 99 %

## 2025-04-04 DIAGNOSIS — E28.2 PCOS (POLYCYSTIC OVARIAN SYNDROME): Primary | ICD-10-CM

## 2025-04-04 PROCEDURE — 1036F TOBACCO NON-USER: CPT | Performed by: PHYSICIAN ASSISTANT

## 2025-04-04 PROCEDURE — G8427 DOCREV CUR MEDS BY ELIG CLIN: HCPCS | Performed by: PHYSICIAN ASSISTANT

## 2025-04-04 PROCEDURE — G8417 CALC BMI ABV UP PARAM F/U: HCPCS | Performed by: PHYSICIAN ASSISTANT

## 2025-04-04 PROCEDURE — 99214 OFFICE O/P EST MOD 30 MIN: CPT | Performed by: PHYSICIAN ASSISTANT

## 2025-04-04 RX ORDER — PHENTERMINE HYDROCHLORIDE 37.5 MG/1
37.5 TABLET ORAL
Qty: 30 TABLET | Refills: 5 | Status: SHIPPED | OUTPATIENT
Start: 2025-04-04 | End: 2025-10-01

## 2025-04-04 RX ORDER — METFORMIN HYDROCHLORIDE 500 MG/1
500 TABLET, EXTENDED RELEASE ORAL
Qty: 30 TABLET | Refills: 5 | Status: SHIPPED | OUTPATIENT
Start: 2025-04-04

## 2025-04-04 ASSESSMENT — ENCOUNTER SYMPTOMS
COUGH: 0
VOMITING: 0
SORE THROAT: 0
SHORTNESS OF BREATH: 0
ABDOMINAL PAIN: 0
RHINORRHEA: 0
WHEEZING: 0
SINUS PRESSURE: 0
DIARRHEA: 0
NAUSEA: 0

## 2025-04-04 NOTE — PROGRESS NOTES
4/4/2025    Assessment:       Diagnosis Orders   1. PCOS (polycystic ovarian syndrome)  Comprehensive Metabolic Panel    Insulin Free & Total    phentermine (ADIPEX-P) 37.5 MG tablet    Cortisol Total        PLAN:     Start phentermine 37.5 mg daily   Continue spironolactone (Aldactone) 50 mg twice a day   Zofran 4 mg ODT as needed for nausea    Follow up in 4 months   Assessment & Plan  1. Polycystic ovary syndrome (PCOS).  - Insulin levels have increased again, which is a common aspect of PCOS and can make weight management challenging.  - Hemoglobin A1c is 5.3%, renal function and electrolytes are within normal limits. Testosterone levels have shown a slight increase.  - A comprehensive approach to weight loss, incorporating diet, exercise, and medication, was discussed. Previous cortisol levels were within the normal range, but given the passage of time, a re-evaluation is warranted.  - Phentermine will be reintroduced for another round of treatment. Metformin 500 mg XR will be initiated at the lowest possible dose to manage elevated insulin levels. Fasting early morning labs will be conducted prior to the next visit to reassess cortisol levels.    2. Hirsutism.  - Continues to experience excessive hair growth.  - Spironolactone has provided some relief with hair growth.  - Discussed the effectiveness of spironolactone in thinning hair and reducing coarseness.  - Continue spironolactone therapy.    3. Stress management.  - Difficulty maintaining weight loss goals due to life stressors.  - No abnormalities detected in physical exam; lymph nodes are normal, lungs are clear, heart rhythm is regular.  - Advised to consider counseling to help manage life stressors that may be impacting her ability to take care of herself. Explore options for therapists who accept new insurance.    Follow-up  - Follow up in 4 months.     Orders Placed This Encounter   Procedures    Comprehensive Metabolic Panel     Standing Status:

## 2025-04-28 SDOH — ECONOMIC STABILITY: INCOME INSECURITY: IN THE LAST 12 MONTHS, WAS THERE A TIME WHEN YOU WERE NOT ABLE TO PAY THE MORTGAGE OR RENT ON TIME?: NO

## 2025-04-28 SDOH — ECONOMIC STABILITY: FOOD INSECURITY: WITHIN THE PAST 12 MONTHS, YOU WORRIED THAT YOUR FOOD WOULD RUN OUT BEFORE YOU GOT MONEY TO BUY MORE.: NEVER TRUE

## 2025-04-28 SDOH — ECONOMIC STABILITY: TRANSPORTATION INSECURITY
IN THE PAST 12 MONTHS, HAS THE LACK OF TRANSPORTATION KEPT YOU FROM MEDICAL APPOINTMENTS OR FROM GETTING MEDICATIONS?: NO

## 2025-04-28 SDOH — ECONOMIC STABILITY: FOOD INSECURITY: WITHIN THE PAST 12 MONTHS, THE FOOD YOU BOUGHT JUST DIDN'T LAST AND YOU DIDN'T HAVE MONEY TO GET MORE.: NEVER TRUE

## 2025-04-28 ASSESSMENT — PATIENT HEALTH QUESTIONNAIRE - PHQ9
SUM OF ALL RESPONSES TO PHQ QUESTIONS 1-9: 8
4. FEELING TIRED OR HAVING LITTLE ENERGY: SEVERAL DAYS
8. MOVING OR SPEAKING SO SLOWLY THAT OTHER PEOPLE COULD HAVE NOTICED. OR THE OPPOSITE, BEING SO FIGETY OR RESTLESS THAT YOU HAVE BEEN MOVING AROUND A LOT MORE THAN USUAL: NOT AT ALL
1. LITTLE INTEREST OR PLEASURE IN DOING THINGS: SEVERAL DAYS
3. TROUBLE FALLING OR STAYING ASLEEP: SEVERAL DAYS
SUM OF ALL RESPONSES TO PHQ QUESTIONS 1-9: 7
5. POOR APPETITE OR OVEREATING: NOT AT ALL
SUM OF ALL RESPONSES TO PHQ QUESTIONS 1-9: 8
3. TROUBLE FALLING OR STAYING ASLEEP: SEVERAL DAYS
6. FEELING BAD ABOUT YOURSELF - OR THAT YOU ARE A FAILURE OR HAVE LET YOURSELF OR YOUR FAMILY DOWN: SEVERAL DAYS
7. TROUBLE CONCENTRATING ON THINGS, SUCH AS READING THE NEWSPAPER OR WATCHING TELEVISION: MORE THAN HALF THE DAYS
7. TROUBLE CONCENTRATING ON THINGS, SUCH AS READING THE NEWSPAPER OR WATCHING TELEVISION: MORE THAN HALF THE DAYS
10. IF YOU CHECKED OFF ANY PROBLEMS, HOW DIFFICULT HAVE THESE PROBLEMS MADE IT FOR YOU TO DO YOUR WORK, TAKE CARE OF THINGS AT HOME, OR GET ALONG WITH OTHER PEOPLE: SOMEWHAT DIFFICULT
1. LITTLE INTEREST OR PLEASURE IN DOING THINGS: SEVERAL DAYS
10. IF YOU CHECKED OFF ANY PROBLEMS, HOW DIFFICULT HAVE THESE PROBLEMS MADE IT FOR YOU TO DO YOUR WORK, TAKE CARE OF THINGS AT HOME, OR GET ALONG WITH OTHER PEOPLE: SOMEWHAT DIFFICULT
9. THOUGHTS THAT YOU WOULD BE BETTER OFF DEAD, OR OF HURTING YOURSELF: SEVERAL DAYS
SUM OF ALL RESPONSES TO PHQ QUESTIONS 1-9: 8
6. FEELING BAD ABOUT YOURSELF - OR THAT YOU ARE A FAILURE OR HAVE LET YOURSELF OR YOUR FAMILY DOWN: SEVERAL DAYS
SUM OF ALL RESPONSES TO PHQ QUESTIONS 1-9: 8
2. FEELING DOWN, DEPRESSED OR HOPELESS: SEVERAL DAYS
4. FEELING TIRED OR HAVING LITTLE ENERGY: SEVERAL DAYS
5. POOR APPETITE OR OVEREATING: NOT AT ALL
2. FEELING DOWN, DEPRESSED OR HOPELESS: SEVERAL DAYS
9. THOUGHTS THAT YOU WOULD BE BETTER OFF DEAD, OR OF HURTING YOURSELF: SEVERAL DAYS
8. MOVING OR SPEAKING SO SLOWLY THAT OTHER PEOPLE COULD HAVE NOTICED. OR THE OPPOSITE - BEING SO FIDGETY OR RESTLESS THAT YOU HAVE BEEN MOVING AROUND A LOT MORE THAN USUAL: NOT AT ALL

## 2025-04-28 ASSESSMENT — COLUMBIA-SUICIDE SEVERITY RATING SCALE - C-SSRS
6. IN YOUR LIFETIME, HAVE YOU EVER DONE ANYTHING, STARTED TO DO ANYTHING, OR PREPARED TO DO ANYTHING TO END YOUR LIFE?: NO
1. IN THE PAST MONTH, HAVE YOU WISHED YOU WERE DEAD OR WISHED YOU COULD GO TO SLEEP AND NOT WAKE UP?: YES
2. IN THE PAST MONTH, HAVE YOU ACTUALLY HAD ANY THOUGHTS OF KILLING YOURSELF?: NO

## 2025-04-29 ENCOUNTER — OFFICE VISIT (OUTPATIENT)
Age: 35
End: 2025-04-29
Payer: COMMERCIAL

## 2025-04-29 VITALS
HEART RATE: 80 BPM | DIASTOLIC BLOOD PRESSURE: 72 MMHG | OXYGEN SATURATION: 98 % | BODY MASS INDEX: 39.24 KG/M2 | SYSTOLIC BLOOD PRESSURE: 116 MMHG | WEIGHT: 250 LBS | TEMPERATURE: 97.3 F | HEIGHT: 67 IN

## 2025-04-29 DIAGNOSIS — I83.893 SYMPTOMATIC VARICOSE VEINS OF BOTH LOWER EXTREMITIES: Primary | ICD-10-CM

## 2025-04-29 DIAGNOSIS — E28.2 PCOS (POLYCYSTIC OVARIAN SYNDROME): ICD-10-CM

## 2025-04-29 DIAGNOSIS — F32.A ANXIETY AND DEPRESSION: ICD-10-CM

## 2025-04-29 DIAGNOSIS — R59.1 LYMPHADENOPATHY: ICD-10-CM

## 2025-04-29 DIAGNOSIS — F41.9 ANXIETY AND DEPRESSION: ICD-10-CM

## 2025-04-29 PROCEDURE — G8417 CALC BMI ABV UP PARAM F/U: HCPCS | Performed by: FAMILY MEDICINE

## 2025-04-29 PROCEDURE — G8427 DOCREV CUR MEDS BY ELIG CLIN: HCPCS | Performed by: FAMILY MEDICINE

## 2025-04-29 PROCEDURE — 99214 OFFICE O/P EST MOD 30 MIN: CPT | Performed by: FAMILY MEDICINE

## 2025-04-29 PROCEDURE — 1036F TOBACCO NON-USER: CPT | Performed by: FAMILY MEDICINE

## 2025-04-29 NOTE — PROGRESS NOTES
Paternal Uncle     Alcohol Abuse Paternal Uncle     Cancer Maternal Aunt         Nasopharyngeal Cancer    Cancer Maternal Aunt         Colon Cancer    Vision Loss Maternal Aunt         Social History     Socioeconomic History    Marital status: Single     Spouse name: Not on file    Number of children: Not on file    Years of education: Not on file    Highest education level: Not on file   Occupational History    Not on file   Tobacco Use    Smoking status: Never     Passive exposure: Never    Smokeless tobacco: Never   Vaping Use    Vaping status: Never Used   Substance and Sexual Activity    Alcohol use: Yes     Alcohol/week: 4.0 standard drinks of alcohol     Types: 1 Glasses of wine, 1 Cans of beer, 2 Drinks containing 0.5 oz of alcohol per week     Comment: Not actively drinking    Drug use: Never    Sexual activity: Yes     Partners: Male   Other Topics Concern    Not on file   Social History Narrative    Not on file     Social Drivers of Health     Financial Resource Strain: Low Risk  (5/28/2024)    Overall Financial Resource Strain (CARDIA)     Difficulty of Paying Living Expenses: Not hard at all   Food Insecurity: No Food Insecurity (4/28/2025)    Hunger Vital Sign     Worried About Running Out of Food in the Last Year: Never true     Ran Out of Food in the Last Year: Never true   Transportation Needs: No Transportation Needs (4/28/2025)    PRAPARE - Transportation     Lack of Transportation (Medical): No     Lack of Transportation (Non-Medical): No   Physical Activity: Sufficiently Active (6/9/2022)    Exercise Vital Sign     Days of Exercise per Week: 5 days     Minutes of Exercise per Session: 50 min   Stress: Not on file   Social Connections: Not on file   Intimate Partner Violence: Not At Risk (6/9/2022)    Humiliation, Afraid, Rape, and Kick questionnaire     Fear of Current or Ex-Partner: No     Emotionally Abused: No     Physically Abused: No     Sexually Abused: No   Housing Stability: Low Risk

## 2025-05-29 ENCOUNTER — TELEMEDICINE ON DEMAND (OUTPATIENT)
Age: 35
End: 2025-05-29
Payer: COMMERCIAL

## 2025-05-29 DIAGNOSIS — R09.81 NASAL CONGESTION: ICD-10-CM

## 2025-05-29 DIAGNOSIS — R51.9 SINUS HEADACHE: ICD-10-CM

## 2025-05-29 DIAGNOSIS — J30.2 SEASONAL ALLERGIES: Primary | ICD-10-CM

## 2025-05-29 PROCEDURE — G8427 DOCREV CUR MEDS BY ELIG CLIN: HCPCS | Performed by: NURSE PRACTITIONER

## 2025-05-29 PROCEDURE — 99213 OFFICE O/P EST LOW 20 MIN: CPT | Performed by: NURSE PRACTITIONER

## 2025-05-29 PROCEDURE — G8417 CALC BMI ABV UP PARAM F/U: HCPCS | Performed by: NURSE PRACTITIONER

## 2025-05-29 PROCEDURE — 1036F TOBACCO NON-USER: CPT | Performed by: NURSE PRACTITIONER

## 2025-05-29 RX ORDER — DESLORATADINE 5 MG/1
5 TABLET ORAL DAILY
Qty: 30 TABLET | Refills: 0 | Status: SHIPPED | OUTPATIENT
Start: 2025-05-29

## 2025-05-29 RX ORDER — FLUTICASONE PROPIONATE 50 MCG
2 SPRAY, SUSPENSION (ML) NASAL DAILY
Qty: 16 G | Refills: 0 | Status: SHIPPED | OUTPATIENT
Start: 2025-05-29

## 2025-05-29 NOTE — PROGRESS NOTES
Octavia Murray, was evaluated through a synchronous (real-time) audio-video encounter. The patient (or guardian if applicable) is aware that this is a billable service, which includes applicable co-pays. This Virtual Visit was conducted with patient's (and/or legal guardian's) consent. Patient identification was verified, and a caregiver was present when appropriate.   The patient was located at Home: Counts include 234 beds at the [facility] [address]  Provider was located at Home (Appt Dept State): [place]  Confirm you are appropriately licensed, registered, or certified to deliver care in the state where the patient is located as indicated above. If you are not or unsure, please re-schedule the visit: Yes, I confirm.     Octavia Murray (:  1990) is a Established patient, presenting virtually for evaluation of the following:    Sinus headache and nasal congestion x 6 days      Below is the assessment and plan developed based on review of pertinent history, physical exam, labs, studies, and medications.    Assessment & Plan  Seasonal allergies    Problem    Orders:    desloratadine (CLARINEX) 5 MG tablet; Take 1 tablet by mouth daily    fluticasone (FLONASE) 50 MCG/ACT nasal spray; 2 sprays by Each Nostril route daily    Review patient instructions within AVS    Nasal congestion    Problem    Orders:    fluticasone (FLONASE) 50 MCG/ACT nasal spray; 2 sprays by Each Nostril route daily    Sinus headache    Problem    Take Tylenol and or ibuprofen if not contraindicated use as the bottles direct    Review patient instructions within AVS    Note given to return to work on May 30, 2025    Patient was instructed to follow-up with provider within 2 to 3 days if symptoms have not improved or if they have worsened        Subjective   This is a 35 year old patient of Dr. Knox consenting to an on demand video visit.  Patient has complaints of: Sinus headache and nasal congestion x 6 days  Patient has treated symptoms

## 2025-06-15 ENCOUNTER — TELEMEDICINE ON DEMAND (OUTPATIENT)
Age: 35
End: 2025-06-15
Payer: COMMERCIAL

## 2025-06-15 DIAGNOSIS — K52.9 ACUTE GASTROENTERITIS: Primary | ICD-10-CM

## 2025-06-15 PROCEDURE — 99213 OFFICE O/P EST LOW 20 MIN: CPT | Performed by: NURSE PRACTITIONER

## 2025-06-15 RX ORDER — LACTOBACILLUS RHAMNOSUS GG 10B CELL
1 CAPSULE ORAL DAILY
Qty: 30 CAPSULE | Refills: 0 | Status: SHIPPED | OUTPATIENT
Start: 2025-06-15

## 2025-06-15 RX ORDER — ONDANSETRON 4 MG/1
4 TABLET, ORALLY DISINTEGRATING ORAL 3 TIMES DAILY PRN
Qty: 21 TABLET | Refills: 0 | Status: SHIPPED | OUTPATIENT
Start: 2025-06-15

## 2025-06-16 ENCOUNTER — TELEPHONE (OUTPATIENT)
Age: 35
End: 2025-06-16

## 2025-06-16 NOTE — TELEPHONE ENCOUNTER
----- Message from Myra HERNANDEZ sent at 6/16/2025  4:19 PM EDT -----  Regarding: [facility] Appointment Request  [facility] Appointment Request    Patient needs appointment for [facility] Appointment Type: New to Provider.    Patient Requested Dates(s): June 18, 2025  Patient Requested Time: afternoon if possible  Provider Name: any available provider within this facility     Reason for Appointment Request: Other Her current provider is on maternity leave. Patient wish to see another provider within this facility to talk about the new prescription that she is currently taking, the Prozac medication and the some issue that is arising with the prescription.   --------------------------------------------------------------------------------------------------------------------------    Relationship to Patient: Self     Call Back Information: Do not leave any message, patient will call back for answer/ VOICEMAIL/ MYCHART   Preferred Call Back Number: Phone [phone] (home)

## 2025-06-19 ENCOUNTER — OFFICE VISIT (OUTPATIENT)
Age: 35
End: 2025-06-19
Payer: COMMERCIAL

## 2025-06-19 VITALS
DIASTOLIC BLOOD PRESSURE: 60 MMHG | RESPIRATION RATE: 15 BRPM | HEIGHT: 67 IN | WEIGHT: 250 LBS | OXYGEN SATURATION: 99 % | SYSTOLIC BLOOD PRESSURE: 108 MMHG | BODY MASS INDEX: 39.24 KG/M2 | TEMPERATURE: 97 F | HEART RATE: 80 BPM

## 2025-06-19 DIAGNOSIS — R53.83 OTHER FATIGUE: ICD-10-CM

## 2025-06-19 DIAGNOSIS — L67.9 HAIR ABNORMALITY: ICD-10-CM

## 2025-06-19 DIAGNOSIS — F41.9 ANXIETY AND DEPRESSION: Primary | ICD-10-CM

## 2025-06-19 DIAGNOSIS — F32.A ANXIETY AND DEPRESSION: Primary | ICD-10-CM

## 2025-06-19 DIAGNOSIS — E28.2 PCOS (POLYCYSTIC OVARIAN SYNDROME): ICD-10-CM

## 2025-06-19 PROCEDURE — 99214 OFFICE O/P EST MOD 30 MIN: CPT | Performed by: STUDENT IN AN ORGANIZED HEALTH CARE EDUCATION/TRAINING PROGRAM

## 2025-06-19 PROCEDURE — G8417 CALC BMI ABV UP PARAM F/U: HCPCS | Performed by: STUDENT IN AN ORGANIZED HEALTH CARE EDUCATION/TRAINING PROGRAM

## 2025-06-19 PROCEDURE — G8427 DOCREV CUR MEDS BY ELIG CLIN: HCPCS | Performed by: STUDENT IN AN ORGANIZED HEALTH CARE EDUCATION/TRAINING PROGRAM

## 2025-06-19 PROCEDURE — G2211 COMPLEX E/M VISIT ADD ON: HCPCS | Performed by: STUDENT IN AN ORGANIZED HEALTH CARE EDUCATION/TRAINING PROGRAM

## 2025-06-19 PROCEDURE — 1036F TOBACCO NON-USER: CPT | Performed by: STUDENT IN AN ORGANIZED HEALTH CARE EDUCATION/TRAINING PROGRAM

## 2025-06-19 RX ORDER — SPIRONOLACTONE 50 MG/1
25 TABLET, FILM COATED ORAL 2 TIMES DAILY
Qty: 180 TABLET | Refills: 5 | Status: SHIPPED | OUTPATIENT
Start: 2025-06-19

## 2025-06-19 ASSESSMENT — ENCOUNTER SYMPTOMS: NAUSEA: 1

## 2025-06-19 NOTE — PROGRESS NOTES
symptoms. She has not taken her morning dose of spironolactone today. She is making efforts to stay hydrated.    Has patchy hair loss, would like a medication to try for this.  Hair thinning on left side.    Review of Systems   Constitutional:  Positive for fatigue.        Hair thinning   Gastrointestinal:  Positive for nausea.   Neurological:  Positive for tremors.       Past Medical History:   Diagnosis Date    Abnormal Pap smear of cervix 2015    Anxiety     Depression 09/2012    Drug effect     Allergic to codeine    Obesity     PCOS (polycystic ovarian syndrome)     Shingles     Thyroid disease      Past Surgical History:   Procedure Laterality Date    LAPAROSCOPY      TONSILLECTOMY       Current Outpatient Medications   Medication Sig Dispense Refill    Minoxidil 5 % FOAM Apply 1/2 capful once daily. 60 g 0    spironolactone (ALDACTONE) 50 MG tablet Take 0.5 tablets by mouth 2 times daily 180 tablet 5    lactobacillus (CULTURELLE) CAPS capsule Take 1 capsule by mouth daily 30 capsule 0    ondansetron (ZOFRAN-ODT) 4 MG disintegrating tablet Take 1 tablet by mouth 3 times daily as needed for Nausea or Vomiting 21 tablet 0    desloratadine (CLARINEX) 5 MG tablet Take 1 tablet by mouth daily 30 tablet 0    fluticasone (FLONASE) 50 MCG/ACT nasal spray 2 sprays by Each Nostril route daily 16 g 0    FLUoxetine (PROZAC) 20 MG capsule Take 1 capsule by mouth daily 30 capsule 3    Elastic Bandages & Supports (MEDICAL COMPRESSION STOCKINGS) MISC 20-30 mm Hg Bilaterally Thigh high Use as directed for lower extremity swelling Dispense 1 pair. 2 each 0    phentermine (ADIPEX-P) 37.5 MG tablet Take 1 tablet by mouth every morning (before breakfast) for 180 days. Max Daily Amount: 37.5 mg 30 tablet 5    metFORMIN (GLUCOPHAGE-XR) 500 MG extended release tablet Take 1 tablet by mouth daily (with breakfast) 30 tablet 5    Segesterone-Ethinyl Estradiol (ANNOVERA) 0.15-0.013 MG/24HR RING Place 1 Ring vaginally once a week 9 each 3

## 2025-06-20 DIAGNOSIS — R09.81 NASAL CONGESTION: ICD-10-CM

## 2025-06-20 DIAGNOSIS — J30.2 SEASONAL ALLERGIES: ICD-10-CM

## 2025-06-23 DIAGNOSIS — J30.2 SEASONAL ALLERGIES: ICD-10-CM

## 2025-06-23 RX ORDER — FLUTICASONE PROPIONATE 50 MCG
2 SPRAY, SUSPENSION (ML) NASAL DAILY
Qty: 48 ML | Refills: 0 | Status: SHIPPED | OUTPATIENT
Start: 2025-06-23

## 2025-06-23 RX ORDER — DESLORATADINE 5 MG/1
5 TABLET ORAL DAILY
Qty: 90 TABLET | Refills: 0 | Status: SHIPPED | OUTPATIENT
Start: 2025-06-23

## 2025-06-25 RX ORDER — METFORMIN HYDROCHLORIDE 500 MG/1
TABLET, EXTENDED RELEASE ORAL
Qty: 90 TABLET | Refills: 3 | Status: SHIPPED | OUTPATIENT
Start: 2025-06-25

## 2025-06-25 NOTE — TELEPHONE ENCOUNTER
90 SCRIPT FOR INSURANCE    Requested Prescriptions     Pending Prescriptions Disp Refills    metFORMIN (GLUCOPHAGE-XR) 500 MG extended release tablet 90 tablet 3     Sig: TAKE 1 TABLET BY MOUTH DAILY (WITH BREAKFAST)

## 2025-06-30 ENCOUNTER — OFFICE VISIT (OUTPATIENT)
Age: 35
End: 2025-06-30
Payer: COMMERCIAL

## 2025-06-30 VITALS
HEART RATE: 78 BPM | OXYGEN SATURATION: 98 % | DIASTOLIC BLOOD PRESSURE: 68 MMHG | BODY MASS INDEX: 39.24 KG/M2 | TEMPERATURE: 97.6 F | WEIGHT: 250 LBS | SYSTOLIC BLOOD PRESSURE: 114 MMHG | HEIGHT: 67 IN

## 2025-06-30 DIAGNOSIS — J30.89 NON-SEASONAL ALLERGIC RHINITIS, UNSPECIFIED TRIGGER: Primary | ICD-10-CM

## 2025-06-30 PROCEDURE — G8417 CALC BMI ABV UP PARAM F/U: HCPCS | Performed by: NURSE PRACTITIONER

## 2025-06-30 PROCEDURE — 99213 OFFICE O/P EST LOW 20 MIN: CPT | Performed by: NURSE PRACTITIONER

## 2025-06-30 PROCEDURE — 1036F TOBACCO NON-USER: CPT | Performed by: NURSE PRACTITIONER

## 2025-06-30 PROCEDURE — G8427 DOCREV CUR MEDS BY ELIG CLIN: HCPCS | Performed by: NURSE PRACTITIONER

## 2025-06-30 RX ORDER — CETIRIZINE HYDROCHLORIDE 10 MG/1
10 TABLET ORAL DAILY
Qty: 30 TABLET | Refills: 0 | Status: SHIPPED | OUTPATIENT
Start: 2025-06-30

## 2025-06-30 RX ORDER — IPRATROPIUM BROMIDE 42 UG/1
2 SPRAY, METERED NASAL 3 TIMES DAILY
Qty: 18 EACH | Refills: 0 | Status: SHIPPED | OUTPATIENT
Start: 2025-06-30 | End: 2025-07-30

## 2025-06-30 ASSESSMENT — ENCOUNTER SYMPTOMS
SHORTNESS OF BREATH: 0
EYE DISCHARGE: 0
STRIDOR: 0
CHEST TIGHTNESS: 0
COUGH: 0
EYE PAIN: 0
PHOTOPHOBIA: 0
SORE THROAT: 0
RHINORRHEA: 0
VOICE CHANGE: 0
EYE REDNESS: 0
TROUBLE SWALLOWING: 0
SINUS PAIN: 0
WHEEZING: 0
EYE ITCHING: 0
SINUS PRESSURE: 0

## 2025-06-30 NOTE — PROGRESS NOTES
Subjective:      Patient ID: Octavia Murray is a 35 y.o. female who presents today for:  Chief Complaint   Patient presents with    Cold Symptoms     Pt states allergies, puffy eyes, X 2 weeks. Denies congestion or fever.        HPI  Patient is here with \"puffy eye lids\" for the last 2 weeks.  Says she has no vision changes.  Denies any drainage from the eyes.   Says she has had some dizziness off and on.  Reports puffiness in the am, better afternoon.   Says she has no sinus pressure or ear pain.  Denies congestion.  Reports sneezing.  No coughing.   Says she has allergy to grass.   Says she thinks its the weather.   Says she has been using Flonase and Claritin.   Says she take NSAID for HA.  Past Medical History:   Diagnosis Date    Abnormal Pap smear of cervix 2015    Anxiety     Depression 09/2012    Drug effect     Allergic to codeine    Obesity     PCOS (polycystic ovarian syndrome)     Shingles     Thyroid disease      Past Surgical History:   Procedure Laterality Date    LAPAROSCOPY      TONSILLECTOMY       Social History     Socioeconomic History    Marital status: Single     Spouse name: Not on file    Number of children: Not on file    Years of education: Not on file    Highest education level: Not on file   Occupational History    Not on file   Tobacco Use    Smoking status: Never     Passive exposure: Never    Smokeless tobacco: Never   Vaping Use    Vaping status: Never Used   Substance and Sexual Activity    Alcohol use: Yes     Alcohol/week: 4.0 standard drinks of alcohol     Types: 1 Glasses of wine, 1 Cans of beer, 2 Drinks containing 0.5 oz of alcohol per week     Comment: Not actively drinking    Drug use: Never    Sexual activity: Yes     Partners: Male   Other Topics Concern    Not on file   Social History Narrative    Not on file     Social Drivers of Health     Financial Resource Strain: Low Risk  (5/28/2024)    Overall Financial Resource Strain (CARDIA)     Difficulty of Paying Living

## 2025-07-14 ENCOUNTER — OFFICE VISIT (OUTPATIENT)
Age: 35
End: 2025-07-14
Payer: COMMERCIAL

## 2025-07-14 VITALS
TEMPERATURE: 97.8 F | DIASTOLIC BLOOD PRESSURE: 60 MMHG | HEART RATE: 94 BPM | OXYGEN SATURATION: 99 % | BODY MASS INDEX: 39.33 KG/M2 | HEIGHT: 67 IN | SYSTOLIC BLOOD PRESSURE: 124 MMHG | WEIGHT: 250.6 LBS | RESPIRATION RATE: 16 BRPM

## 2025-07-14 DIAGNOSIS — F32.A ANXIETY AND DEPRESSION: ICD-10-CM

## 2025-07-14 DIAGNOSIS — E28.2 PCOS (POLYCYSTIC OVARIAN SYNDROME): ICD-10-CM

## 2025-07-14 DIAGNOSIS — L67.9 HAIR ABNORMALITY: Primary | ICD-10-CM

## 2025-07-14 DIAGNOSIS — F41.9 ANXIETY AND DEPRESSION: ICD-10-CM

## 2025-07-14 PROCEDURE — 99214 OFFICE O/P EST MOD 30 MIN: CPT | Performed by: STUDENT IN AN ORGANIZED HEALTH CARE EDUCATION/TRAINING PROGRAM

## 2025-07-14 PROCEDURE — G8417 CALC BMI ABV UP PARAM F/U: HCPCS | Performed by: STUDENT IN AN ORGANIZED HEALTH CARE EDUCATION/TRAINING PROGRAM

## 2025-07-14 PROCEDURE — 1036F TOBACCO NON-USER: CPT | Performed by: STUDENT IN AN ORGANIZED HEALTH CARE EDUCATION/TRAINING PROGRAM

## 2025-07-14 PROCEDURE — G8427 DOCREV CUR MEDS BY ELIG CLIN: HCPCS | Performed by: STUDENT IN AN ORGANIZED HEALTH CARE EDUCATION/TRAINING PROGRAM

## 2025-07-14 RX ORDER — SPIRONOLACTONE 25 MG/1
25 TABLET ORAL 2 TIMES DAILY
Qty: 90 TABLET | Refills: 0 | Status: SHIPPED | OUTPATIENT
Start: 2025-07-14

## 2025-07-14 NOTE — PROGRESS NOTES
Subjective  Octaviaoscar Murray, 35 y.o. female presents today with:  Chief Complaint   Patient presents with    Follow-up     Patient presents today for a 3 weeks follow up for anxiety and depression on 6.19.25. she said she is good.        Anxiety/depression  Patient is on Prozac 20 mg daily.  States that mood is stable.  Used to be on Zoloft but it caused headaches.      She has a history of PCOS and is on metformin and spironolactone 25 mg BID.  Her spironolactone was decreased last month due to complaints of fatigue that were suspected to be due to hypotension  History of Present Illness  The patient presents for evaluation of blood pressure, hair loss, and medication management.    She reports an improvement in her fatigue symptoms following a reduction in her spironolactone dosage. She has a 30-day supply of spironolactone left and recently received a 90-day supply.    She is currently using minoxidil for hair growth and has not experienced any issues. She has noticed that the hair loss on her right side is more severe than on the left. She is uncertain if this is due to stress or a temporary discontinuation of her birth control pills for 1 to 2 months. She has since resumed her birth control regimen.      Review of Systems   Constitutional:  Negative for fatigue.   Psychiatric/Behavioral:  Negative for dysphoric mood. The patient is not nervous/anxious.        Past Medical History:   Diagnosis Date    Abnormal Pap smear of cervix 2015    Anxiety     Depression 09/2012    Drug effect     Allergic to codeine    Obesity     PCOS (polycystic ovarian syndrome)     Shingles     Thyroid disease      Past Surgical History:   Procedure Laterality Date    LAPAROSCOPY      TONSILLECTOMY       Current Outpatient Medications   Medication Sig Dispense Refill    FLUoxetine (PROZAC) 20 MG capsule Take 1 capsule by mouth daily 90 capsule 1    spironolactone (ALDACTONE) 25 MG tablet Take 1 tablet by mouth 2 times daily 90

## 2025-07-24 DIAGNOSIS — J30.89 NON-SEASONAL ALLERGIC RHINITIS, UNSPECIFIED TRIGGER: ICD-10-CM

## 2025-07-24 RX ORDER — IPRATROPIUM BROMIDE 42 UG/1
2 SPRAY, METERED NASAL 3 TIMES DAILY
Qty: 15 ML | Refills: 3 | Status: SHIPPED | OUTPATIENT
Start: 2025-07-24 | End: 2025-08-23

## 2025-07-24 RX ORDER — CETIRIZINE HYDROCHLORIDE 10 MG/1
10 TABLET ORAL DAILY
Qty: 30 TABLET | Refills: 1 | Status: SHIPPED | OUTPATIENT
Start: 2025-07-24

## 2025-07-26 ENCOUNTER — HOSPITAL ENCOUNTER (OUTPATIENT)
Dept: LAB | Age: 35
Discharge: HOME | End: 2025-07-26
Payer: COMMERCIAL

## 2025-07-26 DIAGNOSIS — E28.2 PCOS (POLYCYSTIC OVARIAN SYNDROME): ICD-10-CM

## 2025-07-26 LAB
ALBUMIN SERPL-MCNC: 3.9 G/DL (ref 3.5–4.6)
ALP SERPL-CCNC: 49 U/L (ref 40–130)
ALT SERPL-CCNC: 13 U/L (ref 0–33)
ANION GAP SERPL CALCULATED.3IONS-SCNC: 10 MEQ/L (ref 9–15)
AST SERPL-CCNC: 17 U/L (ref 0–35)
BILIRUB SERPL-MCNC: 0.3 MG/DL (ref 0.2–0.7)
BUN SERPL-MCNC: 12 MG/DL (ref 6–20)
CALCIUM SERPL-MCNC: 9.7 MG/DL (ref 8.5–9.9)
CHLORIDE SERPL-SCNC: 103 MEQ/L (ref 95–107)
CO2 SERPL-SCNC: 25 MEQ/L (ref 20–31)
CREAT SERPL-MCNC: 1.02 MG/DL (ref 0.5–0.9)
GLOBULIN SER CALC-MCNC: 3.3 G/DL (ref 2.3–3.5)
GLUCOSE SERPL-MCNC: 98 MG/DL (ref 70–99)
POTASSIUM SERPL-SCNC: 4.3 MEQ/L (ref 3.4–4.9)
PROT SERPL-MCNC: 7.2 G/DL (ref 6.3–8)
SODIUM SERPL-SCNC: 138 MEQ/L (ref 135–144)

## 2025-07-26 PROCEDURE — 80053 COMPREHEN METABOLIC PANEL: CPT

## 2025-07-26 PROCEDURE — 82533 TOTAL CORTISOL: CPT

## 2025-07-26 PROCEDURE — 36415 COLL VENOUS BLD VENIPUNCTURE: CPT

## 2025-07-26 PROCEDURE — 83525 ASSAY OF INSULIN: CPT

## 2025-07-26 PROCEDURE — 83527 ASSAY OF INSULIN: CPT

## 2025-07-27 LAB
CORTISOL COLLECTION INFO: ABNORMAL
CORTISOL: 32.5 UG/DL (ref 2.5–19.5)

## 2025-07-29 LAB
INSULIN FREE SERPL-ACNC: 17 UIU/ML (ref 3–25)
INSULIN SERPL-ACNC: 22 UIU/ML (ref 3–25)

## 2025-09-05 ENCOUNTER — OFFICE VISIT (OUTPATIENT)
Dept: ENDOCRINOLOGY | Age: 35
End: 2025-09-05
Payer: COMMERCIAL

## 2025-09-05 VITALS
HEIGHT: 67 IN | SYSTOLIC BLOOD PRESSURE: 102 MMHG | HEART RATE: 72 BPM | BODY MASS INDEX: 39.08 KG/M2 | WEIGHT: 249 LBS | DIASTOLIC BLOOD PRESSURE: 58 MMHG

## 2025-09-05 DIAGNOSIS — L68.0 HIRSUTISM: ICD-10-CM

## 2025-09-05 DIAGNOSIS — E28.2 PCOS (POLYCYSTIC OVARIAN SYNDROME): Primary | ICD-10-CM

## 2025-09-05 DIAGNOSIS — E24.9 HYPERCORTISOLISM: ICD-10-CM

## 2025-09-05 PROCEDURE — 1036F TOBACCO NON-USER: CPT | Performed by: PHYSICIAN ASSISTANT

## 2025-09-05 PROCEDURE — G8427 DOCREV CUR MEDS BY ELIG CLIN: HCPCS | Performed by: PHYSICIAN ASSISTANT

## 2025-09-05 PROCEDURE — 99214 OFFICE O/P EST MOD 30 MIN: CPT | Performed by: PHYSICIAN ASSISTANT

## 2025-09-05 PROCEDURE — G8417 CALC BMI ABV UP PARAM F/U: HCPCS | Performed by: PHYSICIAN ASSISTANT

## 2025-09-05 RX ORDER — DEXAMETHASONE 1 MG
TABLET ORAL
Qty: 1 TABLET | Refills: 0 | Status: SHIPPED | OUTPATIENT
Start: 2025-09-05

## 2025-09-05 ASSESSMENT — ENCOUNTER SYMPTOMS
SORE THROAT: 0
NAUSEA: 0
DIARRHEA: 0
SHORTNESS OF BREATH: 0
ABDOMINAL PAIN: 0
VOMITING: 0
RHINORRHEA: 0
COUGH: 0
WHEEZING: 0
SINUS PRESSURE: 0